# Patient Record
Sex: MALE | Race: WHITE | Employment: OTHER | URBAN - METROPOLITAN AREA
[De-identification: names, ages, dates, MRNs, and addresses within clinical notes are randomized per-mention and may not be internally consistent; named-entity substitution may affect disease eponyms.]

---

## 2017-01-05 ENCOUNTER — OFFICE VISIT (OUTPATIENT)
Dept: CARDIOLOGY CLINIC | Age: 62
End: 2017-01-05

## 2017-01-05 VITALS
SYSTOLIC BLOOD PRESSURE: 114 MMHG | HEART RATE: 56 BPM | WEIGHT: 266 LBS | BODY MASS INDEX: 41.75 KG/M2 | HEIGHT: 67 IN | DIASTOLIC BLOOD PRESSURE: 60 MMHG

## 2017-01-05 DIAGNOSIS — E78.00 PURE HYPERCHOLESTEROLEMIA: ICD-10-CM

## 2017-01-05 DIAGNOSIS — I25.119 CORONARY ARTERY DISEASE INVOLVING NATIVE CORONARY ARTERY OF NATIVE HEART WITH ANGINA PECTORIS (HCC): Primary | ICD-10-CM

## 2017-01-05 DIAGNOSIS — I10 ESSENTIAL HYPERTENSION: ICD-10-CM

## 2017-01-05 PROCEDURE — 99214 OFFICE O/P EST MOD 30 MIN: CPT | Performed by: INTERNAL MEDICINE

## 2017-01-05 RX ORDER — LISINOPRIL 5 MG/1
5 TABLET ORAL DAILY
Qty: 90 TABLET | Refills: 3 | Status: SHIPPED | OUTPATIENT
Start: 2017-01-05 | End: 2018-01-21 | Stop reason: SDUPTHER

## 2017-02-03 RX ORDER — PANTOPRAZOLE SODIUM 40 MG/1
TABLET, DELAYED RELEASE ORAL
Qty: 90 TABLET | Refills: 3 | Status: SHIPPED | OUTPATIENT
Start: 2017-02-03 | End: 2020-06-03

## 2017-02-03 RX ORDER — PRASUGREL HCL 10 MG
TABLET ORAL
Qty: 90 TABLET | Refills: 3 | Status: SHIPPED | OUTPATIENT
Start: 2017-02-03 | End: 2018-01-29 | Stop reason: SDUPTHER

## 2017-08-01 RX ORDER — ATORVASTATIN CALCIUM 80 MG/1
TABLET, FILM COATED ORAL
Qty: 90 TABLET | Refills: 1 | Status: SHIPPED | OUTPATIENT
Start: 2017-08-01 | End: 2018-01-24 | Stop reason: SDUPTHER

## 2018-01-22 RX ORDER — LISINOPRIL 5 MG/1
TABLET ORAL
Qty: 90 TABLET | Refills: 0 | OUTPATIENT
Start: 2018-01-22

## 2018-01-22 RX ORDER — LISINOPRIL 5 MG/1
TABLET ORAL
Qty: 90 TABLET | Refills: 0 | Status: SHIPPED | OUTPATIENT
Start: 2018-01-22 | End: 2018-04-25 | Stop reason: SDUPTHER

## 2018-01-24 RX ORDER — ATORVASTATIN CALCIUM 80 MG/1
TABLET, FILM COATED ORAL
Qty: 90 TABLET | Refills: 3 | Status: SHIPPED | OUTPATIENT
Start: 2018-01-24 | End: 2019-01-20 | Stop reason: SDUPTHER

## 2018-01-29 RX ORDER — PRASUGREL 10 MG/1
TABLET, FILM COATED ORAL
Qty: 90 TABLET | Refills: 0 | Status: SHIPPED | OUTPATIENT
Start: 2018-01-29 | End: 2018-04-25 | Stop reason: SDUPTHER

## 2018-02-01 ENCOUNTER — OFFICE VISIT (OUTPATIENT)
Dept: CARDIOLOGY CLINIC | Age: 63
End: 2018-02-01

## 2018-02-01 VITALS
HEART RATE: 55 BPM | HEIGHT: 66 IN | BODY MASS INDEX: 44.52 KG/M2 | SYSTOLIC BLOOD PRESSURE: 138 MMHG | DIASTOLIC BLOOD PRESSURE: 80 MMHG | WEIGHT: 277 LBS

## 2018-02-01 DIAGNOSIS — E78.00 PURE HYPERCHOLESTEROLEMIA: ICD-10-CM

## 2018-02-01 DIAGNOSIS — I10 ESSENTIAL HYPERTENSION: ICD-10-CM

## 2018-02-01 DIAGNOSIS — I25.119 CORONARY ARTERY DISEASE INVOLVING NATIVE CORONARY ARTERY OF NATIVE HEART WITH ANGINA PECTORIS (HCC): Primary | ICD-10-CM

## 2018-02-01 PROCEDURE — 99214 OFFICE O/P EST MOD 30 MIN: CPT | Performed by: INTERNAL MEDICINE

## 2018-02-01 PROCEDURE — 93000 ELECTROCARDIOGRAM COMPLETE: CPT | Performed by: INTERNAL MEDICINE

## 2018-02-01 NOTE — PROGRESS NOTES
mouth daily 30 tablet 5     No current facility-administered medications for this visit. Social History:  History     Social History    Marital Status:      Spouse Name: N/A     Number of Children: N/A    Years of Education: N/A     Occupational History         Social History Main Topics    Smoking status: Never Smoker     Smokeless tobacco: Not on file    Alcohol Use: Yes      Comment: occoc    Drug Use: No    Sexual Activity:     Partners: Female     Other Topics Concern         Social History Narrative       Family History:  family history includes Heart Disease in his brother, father, and mother; Other in his sister. Allergies:  Review of patient's allergies indicates no known allergies. Review of Systems:   · Constitutional: there has been no unanticipated weight loss. No change in energy or activity level   · Eyes: No visual changes   · ENT: No Headaches, hearing loss or vertigo. No mouth sores or sore throat. · Cardiovascular: Reviewed in HPI  · Respiratory: No cough or wheezing, no sputum production. · Gastrointestinal: No abdominal pain, appetite loss, blood in stools. No change in bowel or bladder habits. · Genitourinary: No nocturia, dysuria, trouble voiding  · Musculoskeletal:  No gait disturbance, arthritis in his feet  · Integumentary: No rash or pruritis. · Neurological: No headache, change in muscle strength, numbness or tingling. No change in gait, balance, coordination, mood, affect, memory, mentation, behavior. · Psychiatric: No anxiety or depression  · Endocrine: No malaise or fever  · Hematologic/Lymphatic: No abnormal bruising or bleeding, blood clots or swollen lymph nodes. · Allergic/Immunologic: No nasal congestion or hives.     Physical Examination:    Vitals:    02/01/18 1229   BP: 138/80   Site: Left Arm   Position: Sitting   Cuff Size: Large Adult   Pulse: 55   Weight: 277 lb (125.6 kg)   Height: 5' 6\" (1.676 m)     Body mass

## 2018-02-01 NOTE — LETTER
[address] [facility]iology  [place] [address] [address] [place] [place] [address]  Phone: [phone]  Fax: [phone]    Acacia Acosta MD        February 2, 2018     Mehdi Allan  No address on file    Patient: Manish Zhu  MR Number: [id number]  YOB: 1955  Date of Visit: 2/1/2018    Dear Dr. Mhedi Allan:    Thank you for the request for consultation for Radha Hayes to me for the evaluation of Mr Jia Morales. Below are the relevant portions of my assessment and plan of care. [address]   Cardiac Evaluation      Patient: Manish Zhu  YOB: 1955  Date: 2/1/18       Chief Complaint   Patient presents with    Coronary Artery Disease    Hyperlipidemia        Referring provider: Mehdi Allan    History of Present Illness:  Mr Jia Morales is seen today in follow up. He was hospitalized January, 2016. His hospital course included a plain stress test, at which time he had very poor exercise tolerance. He was only able to walk on a treadmill for 4 minutes with a high pertinence of blood pressure response. He had less than 1 mm up-sloping ST depression; however, had progressive angina on the treadmill. He was admitted to the intensive care and serial cardiac enzymes were performed which have been rapidly increasing from 0.01 to 0.02 to 0.03. Patient had an angiogram / PCI of RCA done with  3.0 x 38 Alpine. Today, Mr Jia Morales states he has been well. Denies any chest pain, dizziness, or edema. He does have palpitations that come and go. He has not been exercising because of foot pain. He has gained approx 10lbs. Past Medical History:   has a past medical history of CAD (coronary artery disease) and Hyperlipidemia. Surgical History:   has a past surgical history that includes Total hip arthroplasty (Left); Coronary angioplasty with stent (2016); and joint replacement (2000).      Current Outpatient Prescriptions · Neurological: No headache, change in muscle strength, numbness or tingling. No change in gait, balance, coordination, mood, affect, memory, mentation, behavior. · Psychiatric: No anxiety or depression  · Endocrine: No malaise or fever  · Hematologic/Lymphatic: No abnormal bruising or bleeding, blood clots or swollen lymph nodes. · Allergic/Immunologic: No nasal congestion or hives. Physical Examination:    Vitals:    02/01/18 1229   BP: 138/80   Site: Left Arm   Position: Sitting   Cuff Size: Large Adult   Pulse: 55   Weight: 277 lb (125.6 kg)   Height: 5' 6\" (1.676 m)     Body mass index is 44.71 kg/m². Wt Readings from Last 3 Encounters:   02/01/18 277 lb (125.6 kg)   01/05/17 266 lb (120.7 kg)   06/12/16 254 lb (115.2 kg)      BP Readings from Last 3 Encounters:   02/01/18 138/80   01/05/17 114/60   06/12/16 131/53      Constitutional and General Appearance:  appears stated age  Respiratory:  · Normal excursion and expansion without use of accessory muscles  · Resp Auscultation: Normal breath sounds without dullness  Cardiovascular:  · The apical impulses not displaced  · Heart is regular rate and rhythm with normal S1, S2  · The carotid upstroke is normal, no bruit noted   · JVP is not elevated  · Peripheral pulses are symmetrical  · There is no clubbing, cyanosis of the extremities  · No edema  · Femoral Arteries: 2+ and equal  · Pedal Pulses: 2+ and equal   Abdomen:  · No masses or tenderness  · Normal bowel sounds  Neurological/Psychiatric:  · Alert and oriented x3  · Moves all extremities well  · Exhibits normal gait balance and coordination    Assessment:  1. Chest discomfort --none at this time   2. Pure hypercholesterolemia -stable, labs 3/4/16: ; TRIG 246; HDL 40; LDL 41   3. Essential hypertension -stable   4.  Coronary artery disease involving native coronary artery of native heart with angina pectoris -stable  EKG today shows sinus bradycardia with no ischemia

## 2018-02-02 NOTE — COMMUNICATION BODY
[address]   Cardiac Evaluation      Patient: Tressa Hodgkins  YOB: 1955  Date: 2/1/18       Chief Complaint   Patient presents with    Coronary Artery Disease    Hyperlipidemia        Referring provider: Maciel Aragon    History of Present Illness:  Mr Carol Hayes is seen today in follow up. He was hospitalized January, 2016. His hospital course included a plain stress test, at which time he had very poor exercise tolerance. He was only able to walk on a treadmill for 4 minutes with a high pertinence of blood pressure response. He had less than 1 mm up-sloping ST depression; however, had progressive angina on the treadmill. He was admitted to the intensive care and serial cardiac enzymes were performed which have been rapidly increasing from 0.01 to 0.02 to 0.03. Patient had an angiogram / PCI of RCA done with  3.0 x 38 Alpine. Today, Mr Carol Hayes states he has been well. Denies any chest pain, dizziness, or edema. He does have palpitations that come and go. He has not been exercising because of foot pain. He has gained approx 10lbs. Past Medical History:   has a past medical history of CAD (coronary artery disease) and Hyperlipidemia. Surgical History:   has a past surgical history that includes Total hip arthroplasty (Left); Coronary angioplasty with stent (2016); and joint replacement (2000). Current Outpatient Prescriptions   Medication Sig Dispense Refill    prasugrel (EFFIENT) 10 MG TABS TAKE 1 TABLET BY MOUTH EVERY DAY 90 tablet 0    metoprolol tartrate (LOPRESSOR) 25 MG tablet TAKE ONE TABLET BY MOUTH TWICE DAILY 180 tablet 3    atorvastatin (LIPITOR) 80 MG tablet TAKE 1 TABLET BY MOUTH EVERY NIGHT.  90 tablet 3    lisinopril (PRINIVIL;ZESTRIL) 5 MG tablet TAKE 1 TABLET BY MOUTH EVERY DAY 90 tablet 0    pantoprazole (PROTONIX) 40 MG tablet TAKE ONE TABLET BY MOUTH EVERY MORNING BEFORE BREAKFAST 90 tablet 3    aspirin EC 81 MG EC tablet Take 1 tablet by Stable cardiac status. I discussed with him the importance of regular exercise and advised him to work on weight loss. No med changes. Check lipids and CMP. FU 6 months. Thank you for allowing to me to participate in the care of Ryan Mccord.

## 2018-04-27 RX ORDER — PRASUGREL 10 MG/1
TABLET, FILM COATED ORAL
Qty: 90 TABLET | Refills: 3 | Status: SHIPPED | OUTPATIENT
Start: 2018-04-27 | End: 2019-03-01 | Stop reason: SDUPTHER

## 2018-04-27 RX ORDER — LISINOPRIL 5 MG/1
TABLET ORAL
Qty: 90 TABLET | Refills: 3 | Status: SHIPPED | OUTPATIENT
Start: 2018-04-27 | End: 2019-03-01 | Stop reason: SDUPTHER

## 2019-01-21 RX ORDER — ATORVASTATIN CALCIUM 80 MG/1
TABLET, FILM COATED ORAL
Qty: 30 TABLET | Refills: 0 | Status: SHIPPED | OUTPATIENT
Start: 2019-01-21 | End: 2019-03-01 | Stop reason: SDUPTHER

## 2019-03-01 RX ORDER — PRASUGREL 10 MG/1
TABLET, FILM COATED ORAL
Qty: 10 TABLET | Refills: 0 | Status: SHIPPED | OUTPATIENT
Start: 2019-03-01 | End: 2019-03-18 | Stop reason: SDUPTHER

## 2019-03-01 RX ORDER — ATORVASTATIN CALCIUM 80 MG/1
TABLET, FILM COATED ORAL
Qty: 10 TABLET | Refills: 0 | Status: SHIPPED | OUTPATIENT
Start: 2019-03-01 | End: 2019-03-18 | Stop reason: SDUPTHER

## 2019-03-01 RX ORDER — LISINOPRIL 5 MG/1
TABLET ORAL
Qty: 10 TABLET | Refills: 0 | Status: SHIPPED | OUTPATIENT
Start: 2019-03-01 | End: 2019-03-18 | Stop reason: SDUPTHER

## 2019-03-18 RX ORDER — LISINOPRIL 5 MG/1
TABLET ORAL
Qty: 10 TABLET | Refills: 0 | Status: SHIPPED | OUTPATIENT
Start: 2019-03-18 | End: 2019-03-25 | Stop reason: SDUPTHER

## 2019-03-18 RX ORDER — ATORVASTATIN CALCIUM 80 MG/1
TABLET, FILM COATED ORAL
Qty: 10 TABLET | Refills: 0 | Status: SHIPPED | OUTPATIENT
Start: 2019-03-18 | End: 2019-03-25 | Stop reason: SDUPTHER

## 2019-03-18 RX ORDER — PRASUGREL 10 MG/1
TABLET, FILM COATED ORAL
Qty: 10 TABLET | Refills: 0 | Status: SHIPPED | OUTPATIENT
Start: 2019-03-18 | End: 2019-03-27 | Stop reason: SDUPTHER

## 2019-03-25 ENCOUNTER — TELEPHONE (OUTPATIENT)
Dept: CARDIOLOGY CLINIC | Age: 64
End: 2019-03-25

## 2019-03-25 RX ORDER — LISINOPRIL 5 MG/1
TABLET ORAL
Qty: 30 TABLET | Refills: 0 | Status: SHIPPED | OUTPATIENT
Start: 2019-03-25 | End: 2019-03-27 | Stop reason: SDUPTHER

## 2019-03-25 RX ORDER — ATORVASTATIN CALCIUM 80 MG/1
TABLET, FILM COATED ORAL
Qty: 30 TABLET | Refills: 0 | Status: SHIPPED | OUTPATIENT
Start: 2019-03-25 | End: 2019-03-27 | Stop reason: SDUPTHER

## 2019-03-27 ENCOUNTER — OFFICE VISIT (OUTPATIENT)
Dept: CARDIOLOGY CLINIC | Age: 64
End: 2019-03-27
Payer: COMMERCIAL

## 2019-03-27 VITALS
DIASTOLIC BLOOD PRESSURE: 72 MMHG | HEIGHT: 66 IN | WEIGHT: 290 LBS | HEART RATE: 69 BPM | BODY MASS INDEX: 46.61 KG/M2 | SYSTOLIC BLOOD PRESSURE: 136 MMHG

## 2019-03-27 DIAGNOSIS — I10 ESSENTIAL HYPERTENSION: Primary | ICD-10-CM

## 2019-03-27 DIAGNOSIS — E78.00 PURE HYPERCHOLESTEROLEMIA: ICD-10-CM

## 2019-03-27 DIAGNOSIS — I25.119 CORONARY ARTERY DISEASE INVOLVING NATIVE CORONARY ARTERY OF NATIVE HEART WITH ANGINA PECTORIS (HCC): ICD-10-CM

## 2019-03-27 PROCEDURE — 99214 OFFICE O/P EST MOD 30 MIN: CPT | Performed by: INTERNAL MEDICINE

## 2019-03-27 PROCEDURE — 93000 ELECTROCARDIOGRAM COMPLETE: CPT | Performed by: INTERNAL MEDICINE

## 2019-03-27 RX ORDER — PRASUGREL 10 MG/1
TABLET, FILM COATED ORAL
Qty: 90 TABLET | Refills: 3 | Status: SHIPPED | OUTPATIENT
Start: 2019-03-27 | End: 2020-03-24 | Stop reason: SDUPTHER

## 2019-03-27 RX ORDER — LISINOPRIL 5 MG/1
TABLET ORAL
Qty: 90 TABLET | Refills: 3 | Status: SHIPPED | OUTPATIENT
Start: 2019-03-27

## 2019-03-27 RX ORDER — ATORVASTATIN CALCIUM 80 MG/1
TABLET, FILM COATED ORAL
Qty: 90 TABLET | Refills: 3 | Status: SHIPPED | OUTPATIENT
Start: 2019-03-27

## 2019-09-17 ENCOUNTER — OFFICE VISIT (OUTPATIENT)
Dept: CARDIOLOGY CLINIC | Age: 64
End: 2019-09-17
Payer: COMMERCIAL

## 2019-09-17 VITALS
HEART RATE: 58 BPM | WEIGHT: 267.3 LBS | SYSTOLIC BLOOD PRESSURE: 114 MMHG | HEIGHT: 66 IN | DIASTOLIC BLOOD PRESSURE: 60 MMHG | BODY MASS INDEX: 42.96 KG/M2 | OXYGEN SATURATION: 95 %

## 2019-09-17 DIAGNOSIS — I10 ESSENTIAL HYPERTENSION: ICD-10-CM

## 2019-09-17 DIAGNOSIS — E78.00 PURE HYPERCHOLESTEROLEMIA: Primary | ICD-10-CM

## 2019-09-17 DIAGNOSIS — I25.119 CORONARY ARTERY DISEASE INVOLVING NATIVE CORONARY ARTERY OF NATIVE HEART WITH ANGINA PECTORIS (HCC): ICD-10-CM

## 2019-09-17 DIAGNOSIS — E11.9 CONTROLLED TYPE 2 DIABETES MELLITUS WITHOUT COMPLICATION, WITHOUT LONG-TERM CURRENT USE OF INSULIN (HCC): ICD-10-CM

## 2019-09-17 PROCEDURE — 99214 OFFICE O/P EST MOD 30 MIN: CPT | Performed by: INTERNAL MEDICINE

## 2019-09-17 RX ORDER — GABAPENTIN 800 MG/1
TABLET ORAL 3 TIMES DAILY
COMMUNITY
Start: 2019-09-09 | End: 2021-12-14

## 2019-09-17 NOTE — PROGRESS NOTES
lymph nodes. · Allergic/Immunologic: No nasal congestion or hives. Physical Examination:    Vitals:    09/17/19 1523   BP: 114/60   Site: Right Upper Arm   Position: Sitting   Cuff Size: Medium Adult   Pulse: 58   SpO2: 95%   Weight: 267 lb 4.8 oz (121.2 kg)   Height: 5' 6\" (1.676 m)     Body mass index is 43.14 kg/m². Wt Readings from Last 3 Encounters:   09/17/19 267 lb 4.8 oz (121.2 kg)   03/27/19 290 lb (131.5 kg)   02/01/18 277 lb (125.6 kg)      BP Readings from Last 3 Encounters:   09/17/19 114/60   03/27/19 136/72   02/01/18 138/80      Constitutional and General Appearance:  appears stated age, obese, big white beard  Respiratory:  · Normal excursion and expansion without use of accessory muscles  · Resp Auscultation: Normal breath sounds without dullness  Cardiovascular:  · The apical impulses not displaced  · Heart is regular rate and rhythm with normal S1, S2  · The carotid upstroke is normal, no bruit noted   · JVP is not elevated  · Peripheral pulses are symmetrical  · There is no clubbing, cyanosis of the extremities  · No edema  · Femoral Arteries: 2+ and equal  · Pedal Pulses: 2+ and equal   Abdomen:  · No masses or tenderness  · Normal bowel sounds  Neurological/Psychiatric:  · Alert and oriented x3  · Moves all extremities well  · Exhibits normal gait balance and coordination    Assessment:  1. Chest discomfort --no complaints at this time   2. Pure hypercholesterolemia - stable on labs 4/17/19: ; TRIG 116; HDL 39; LDL 70   3. Essential hypertension -stable   4. Coronary artery disease involving native coronary artery of native heart with angina pectoris -stable  GXT 8/16: normal perfusion, normal LV function  Cardiac Cath 1/16:  LVEDP - 8, mild inferior hypokinesis, EF 55%. Cors: LM - nl, LAD 0%, 30% at DX1 and 50% after Dx2.  LCX - nl, RCA - 70% mid tapering to 99% mid  PCI of RCA done with 2.5x15 balloon ff by 3.0 x 38 Alpine, and prox with 3.5x20 noncompliant with 0%

## 2019-09-17 NOTE — LETTER
 gabapentin (NEURONTIN) 600 MG tablet TAKE 1 TABLET BY MOUTH THREE TIMES DAILY      metFORMIN (GLUCOPHAGE) 500 MG tablet TK 1 T PO BID WC  2    atorvastatin (LIPITOR) 80 MG tablet TAKE 1 TABLET BY MOUTH EVERY NIGHT. 90 tablet 3    lisinopril (PRINIVIL;ZESTRIL) 5 MG tablet TAKE 1 TABLET BY MOUTH EVERY DAY 90 tablet 3    metoprolol tartrate (LOPRESSOR) 25 MG tablet TAKE ONE TABLET BY MOUTH TWICE DAILY 180 tablet 3    prasugrel (EFFIENT) 10 MG TABS TAKE 1 TABLET BY MOUTH EVERY DAY 90 tablet 3    aspirin EC 81 MG EC tablet Take 1 tablet by mouth daily 30 tablet 5    pantoprazole (PROTONIX) 40 MG tablet TAKE ONE TABLET BY MOUTH EVERY MORNING BEFORE BREAKFAST (Patient not taking: Reported on 9/17/2019) 90 tablet 3     No current facility-administered medications for this visit. Social History:  History     Social History    Marital Status:      Spouse Name: N/A     Number of Children: N/A    Years of Education: N/A     Occupational History         Social History Main Topics    Smoking status: Never Smoker     Smokeless tobacco: Not on file    Alcohol Use: Yes      Comment: occoc    Drug Use: No    Sexual Activity:     Partners: Female     Other Topics Concern         Social History Narrative       Family History:  family history includes Heart Disease in his brother, father, and mother; Other in his sister. Allergies:  Patient has no known allergies. Review of Systems:   · Constitutional: there has been no unanticipated weight loss. No change in energy or activity level   · Eyes: No visual changes   · ENT: No Headaches, hearing loss or vertigo. No mouth sores or sore throat. · Cardiovascular: Reviewed in HPI  · Respiratory: No cough or wheezing, no sputum production. · Gastrointestinal: No abdominal pain, appetite loss, blood in stools. No change in bowel or bladder habits.   · Genitourinary: No nocturia, dysuria, trouble voiding

## 2019-09-18 ENCOUNTER — HOSPITAL ENCOUNTER (OUTPATIENT)
Age: 64
Discharge: HOME | End: 2019-09-18
Payer: COMMERCIAL

## 2019-09-18 DIAGNOSIS — E78.00 PURE HYPERCHOLESTEROLEMIA: ICD-10-CM

## 2019-09-18 DIAGNOSIS — E11.9 CONTROLLED TYPE 2 DIABETES MELLITUS WITHOUT COMPLICATION, WITHOUT LONG-TERM CURRENT USE OF INSULIN (HCC): ICD-10-CM

## 2019-09-18 LAB
A/G RATIO: 1.3 (ref 1.1–2.2)
ALBUMIN SERPL-MCNC: 4 G/DL (ref 3.4–5)
ALP BLD-CCNC: 64 U/L (ref 40–129)
ALT SERPL-CCNC: 25 U/L (ref 10–40)
ANION GAP SERPL CALCULATED.3IONS-SCNC: 11 MMOL/L (ref 3–16)
AST SERPL-CCNC: 19 U/L (ref 15–37)
BILIRUB SERPL-MCNC: 0.3 MG/DL (ref 0–1)
BUN BLDV-MCNC: 20 MG/DL (ref 7–20)
CALCIUM SERPL-MCNC: 9.5 MG/DL (ref 8.3–10.6)
CHLORIDE BLD-SCNC: 106 MMOL/L (ref 99–110)
CHOLESTEROL, TOTAL: 109 MG/DL (ref 0–199)
CO2: 25 MMOL/L (ref 21–32)
CREAT SERPL-MCNC: 1.2 MG/DL (ref 0.8–1.3)
ESTIMATED AVERAGE GLUCOSE: 125.5 MG/DL
GFR AFRICAN AMERICAN: >60
GFR NON-AFRICAN AMERICAN: >60
GLOBULIN: 3 G/DL
GLUCOSE BLD-MCNC: 113 MG/DL (ref 70–99)
HBA1C MFR BLD: 6 %
HDLC SERPL-MCNC: 37 MG/DL (ref 40–60)
LDL CHOLESTEROL CALCULATED: 51 MG/DL
POTASSIUM SERPL-SCNC: 4.6 MMOL/L (ref 3.5–5.1)
SODIUM BLD-SCNC: 142 MMOL/L (ref 136–145)
TOTAL PROTEIN: 7 G/DL (ref 6.4–8.2)
TRIGL SERPL-MCNC: 106 MG/DL (ref 0–150)
VLDLC SERPL CALC-MCNC: 21 MG/DL

## 2019-09-18 PROCEDURE — 36415 COLL VENOUS BLD VENIPUNCTURE: CPT

## 2019-09-18 PROCEDURE — 83036 HEMOGLOBIN GLYCOSYLATED A1C: CPT

## 2019-09-18 PROCEDURE — 80061 LIPID PANEL: CPT

## 2019-09-18 PROCEDURE — 80053 COMPREHEN METABOLIC PANEL: CPT

## 2020-03-24 RX ORDER — PRASUGREL 10 MG/1
TABLET, FILM COATED ORAL
Qty: 90 TABLET | Refills: 3 | Status: SHIPPED | OUTPATIENT
Start: 2020-03-24 | End: 2021-01-15

## 2020-06-03 ENCOUNTER — OFFICE VISIT (OUTPATIENT)
Dept: CARDIOLOGY CLINIC | Age: 65
End: 2020-06-03
Payer: COMMERCIAL

## 2020-06-03 VITALS
OXYGEN SATURATION: 95 % | WEIGHT: 270 LBS | HEART RATE: 62 BPM | SYSTOLIC BLOOD PRESSURE: 110 MMHG | TEMPERATURE: 95.7 F | BODY MASS INDEX: 43.39 KG/M2 | HEIGHT: 66 IN | DIASTOLIC BLOOD PRESSURE: 70 MMHG

## 2020-06-03 PROCEDURE — 93000 ELECTROCARDIOGRAM COMPLETE: CPT | Performed by: INTERNAL MEDICINE

## 2020-06-03 PROCEDURE — 99214 OFFICE O/P EST MOD 30 MIN: CPT | Performed by: INTERNAL MEDICINE

## 2020-06-03 NOTE — PROGRESS NOTES
MOUTH TWICE DAILY 180 tablet 3    aspirin EC 81 MG EC tablet Take 1 tablet by mouth daily 30 tablet 5     No current facility-administered medications for this visit. Social History:  History     Social History    Marital Status:      Spouse Name: N/A     Number of Children: N/A    Years of Education: N/A     Occupational History         Social History Main Topics    Smoking status: Never Smoker     Smokeless tobacco: Not on file    Alcohol Use: Yes      Comment: occoc    Drug Use: No    Sexual Activity:     Partners: Female     Other Topics Concern     of people and pets      Social History Narrative       Family History:  family history includes Heart Disease in his brother, father, and mother; Other in his sister. Allergies:  Patient has no known allergies. Review of Systems:   · Constitutional: there has been no unanticipated weight loss. No change in energy or activity level   · Eyes: No visual changes   · ENT: No Headaches, hearing loss or vertigo. No mouth sores or sore throat. · Cardiovascular: Reviewed in HPI  · Respiratory: No cough or wheezing, no sputum production. · Gastrointestinal: No abdominal pain, appetite loss, blood in stools. No change in bowel or bladder habits. · Genitourinary: No nocturia, dysuria, trouble voiding  · Musculoskeletal:  No gait disturbance, arthritis in his feet  · Integumentary: No rash or pruritis. · Neurological: No headache, change in muscle strength, numbness or tingling. No change in gait, balance, coordination, mood, affect, memory, mentation, behavior. · Psychiatric: No anxiety or depression  · Endocrine: No malaise or fever  · Hematologic/Lymphatic: No abnormal bruising or bleeding, blood clots or swollen lymph nodes. · Allergic/Immunologic: No nasal congestion or hives.     Physical Examination:    Vitals:    06/03/20 1432   BP: 110/70   Site: Left Upper Arm   Position: Sitting   Cuff Size: Large Adult

## 2020-06-03 NOTE — LETTER
tablet 3    gabapentin (NEURONTIN) 800 MG tablet       metFORMIN (GLUCOPHAGE) 500 MG tablet TK 1 T PO BID WC  2    atorvastatin (LIPITOR) 80 MG tablet TAKE 1 TABLET BY MOUTH EVERY NIGHT. 90 tablet 3    lisinopril (PRINIVIL;ZESTRIL) 5 MG tablet TAKE 1 TABLET BY MOUTH EVERY DAY 90 tablet 3    metoprolol tartrate (LOPRESSOR) 25 MG tablet TAKE ONE TABLET BY MOUTH TWICE DAILY 180 tablet 3    aspirin EC 81 MG EC tablet Take 1 tablet by mouth daily 30 tablet 5     No current facility-administered medications for this visit. Social History:  History     Social History    Marital Status:      Spouse Name: N/A     Number of Children: N/A    Years of Education: N/A     Occupational History         Social History Main Topics    Smoking status: Never Smoker     Smokeless tobacco: Not on file    Alcohol Use: Yes      Comment: occoc    Drug Use: No    Sexual Activity:     Partners: Female     Other Topics Concern     of people and pets      Social History Narrative       Family History:  family history includes Heart Disease in his brother, father, and mother; Other in his sister. Allergies:  Patient has no known allergies. Review of Systems:   · Constitutional: there has been no unanticipated weight loss. No change in energy or activity level   · Eyes: No visual changes   · ENT: No Headaches, hearing loss or vertigo. No mouth sores or sore throat. · Cardiovascular: Reviewed in HPI  · Respiratory: No cough or wheezing, no sputum production. · Gastrointestinal: No abdominal pain, appetite loss, blood in stools. No change in bowel or bladder habits. · Genitourinary: No nocturia, dysuria, trouble voiding  · Musculoskeletal:  No gait disturbance, arthritis in his feet  · Integumentary: No rash or pruritis. · Neurological: No headache, change in muscle strength, numbness or tingling. No change in gait, balance, coordination, mood, affect, memory, mentation, behavior. · Psychiatric: No anxiety or depression  · Endocrine: No malaise or fever  · Hematologic/Lymphatic: No abnormal bruising or bleeding, blood clots or swollen lymph nodes. · Allergic/Immunologic: No nasal congestion or hives. Physical Examination:    Vitals:    06/03/20 1432   BP: 110/70   Site: Left Upper Arm   Position: Sitting   Cuff Size: Large Adult   Pulse: 62   Temp: 95.7 °F (35.4 °C)   SpO2: 95%   Weight: 270 lb (122.5 kg)   Height: 5' 6\" (1.676 m)     Body mass index is 43.58 kg/m². Wt Readings from Last 3 Encounters:   06/03/20 270 lb (122.5 kg)   09/17/19 267 lb 4.8 oz (121.2 kg)   03/27/19 290 lb (131.5 kg)      BP Readings from Last 3 Encounters:   06/03/20 110/70   09/17/19 114/60   03/27/19 136/72      Constitutional and General Appearance:  appears stated age, obese, big white beard  Respiratory:  · Normal excursion and expansion without use of accessory muscles  · Resp Auscultation: Normal breath sounds without dullness  Cardiovascular:  · The apical impulses not displaced  · Heart is regular rate and rhythm with normal S1, S2  · The carotid upstroke is normal, no bruit noted   · JVP is not elevated  · Peripheral pulses are symmetrical  · There is no clubbing, cyanosis of the extremities  · No edema  · Femoral Arteries: 2+ and equal  · Pedal Pulses: 2+ and equal   Abdomen:  · No masses or tenderness  · Normal bowel sounds  Neurological/Psychiatric:  · Alert and oriented x3  · Moves all extremities well  · Exhibits normal gait balance and coordination    Assessment:  1. Chest discomfort --no complaints at this time, recently had an episode of weakness/fatigue   2. Pure hypercholesterolemia - stable on labs 9/18/19: ; TRIG 106; HDL 37; LDL 51   3. Essential hypertension -stable   4.  Coronary artery disease involving native coronary artery of native heart with angina pectoris -stable  GXT 8/16: normal perfusion, normal LV function

## 2020-12-16 ENCOUNTER — OFFICE VISIT (OUTPATIENT)
Dept: CARDIOLOGY CLINIC | Age: 65
End: 2020-12-16
Payer: COMMERCIAL

## 2020-12-16 VITALS
WEIGHT: 261 LBS | HEART RATE: 59 BPM | DIASTOLIC BLOOD PRESSURE: 60 MMHG | HEIGHT: 66 IN | SYSTOLIC BLOOD PRESSURE: 118 MMHG | BODY MASS INDEX: 41.95 KG/M2 | OXYGEN SATURATION: 96 %

## 2020-12-16 PROCEDURE — 99214 OFFICE O/P EST MOD 30 MIN: CPT | Performed by: INTERNAL MEDICINE

## 2020-12-16 NOTE — PROGRESS NOTES
[facility]   Cardiac Evaluation      Patient: Ac Johnson  YOB: 1955  Date: 12/16/20       Chief Complaint   Patient presents with    Coronary Artery Disease    Hypertension        Referring provider: Almita Saleh    History of Present Illness:  Mr Armin Huynh is seen today in follow up. He was hospitalized January, 2016. His hospital course included a plain stress test, at which time he had very poor exercise tolerance. He was only able to walk on a treadmill for 4 minutes with a hypertensive blood pressure response. He had less than 1 mm up-sloping ST depression; however, had progressive angina on the treadmill. He was admitted to the intensive care and serial cardiac enzymes were performed which continued to  increasing from 0.01 to 0.02 to 0.03. He underwent angiogram / PCI of RCA done with  3.0 x 38 Alpine. Today, Mr Armin Huynh states he has been fine. He denies any chest pain, palpitations, ROLLE, dizziness, or edema. 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 Alicia Avila recently received a new c-pap and follows with  pulmonary. He is not exercising like he used to. Past Medical History:   has a past medical history of CAD (coronary artery disease) and Hyperlipidemia. Surgical History:   has a past surgical history that includes Total hip arthroplasty (Left); Coronary angioplasty with stent (2016); and joint replacement (2000). Current Outpatient Medications   Medication Sig Dispense Refill    prasugrel (EFFIENT) 10 MG TABS TAKE 1 TABLET BY MOUTH EVERY DAY 90 tablet 3    gabapentin (NEURONTIN) 800 MG tablet 3 times daily.  metFORMIN (GLUCOPHAGE) 500 MG tablet 1,000 mg daily (with breakfast)   2    atorvastatin (LIPITOR) 80 MG tablet TAKE 1 TABLET BY MOUTH EVERY NIGHT.  90 tablet 3    lisinopril (PRINIVIL;ZESTRIL) 5 MG tablet TAKE 1 TABLET BY MOUTH EVERY DAY 90 tablet 3    metoprolol tartrate (LOPRESSOR) 25 MG tablet TAKE ONE Adult   Pulse: 59   SpO2: 96%   Weight: 261 lb (118.4 kg)   Height: 5' 6\" (1.676 m)     Body mass index is 42.13 kg/m². Wt Readings from Last 3 Encounters:   12/16/20 261 lb (118.4 kg)   06/03/20 270 lb (122.5 kg)   09/17/19 267 lb 4.8 oz (121.2 kg)      BP Readings from Last 3 Encounters:   12/16/20 118/60   06/03/20 110/70   09/17/19 114/60      Constitutional and General Appearance:  appears stated age, obese, big white beard  Respiratory:  · Normal excursion and expansion without use of accessory muscles  · Resp Auscultation: Normal breath sounds without dullness  Cardiovascular:  · The apical impulses not displaced  · Heart is regular rate and rhythm with normal S1, S2  · The carotid upstroke is normal, no bruit noted   · JVP is not elevated  · Peripheral pulses are symmetrical  · There is no clubbing, cyanosis of the extremities  · No edema  · Femoral Arteries: 2+ and equal  · Pedal Pulses: 2+ and equal   Abdomen:  · No masses or tenderness  · Normal bowel sounds  Neurological/Psychiatric:  · Alert and oriented x3  · Moves all extremities well  · Exhibits normal gait balance and coordination    Assessment:  1. Chest discomfort --no complaints at this time   2. Pure hypercholesterolemia - stable on labs 9/18/19: ; TRIG 106; HDL 37; LDL 51   3. Essential hypertension -stable   4. Coronary artery disease involving native coronary artery of native heart with angina pectoris -stable  GXT 8/16: normal perfusion, normal LV function  Cardiac Cath 1/16:  LVEDP - 8, mild inferior hypokinesis, EF 55%. Cors: LM - nl, LAD 0%, 30% at DX1 and 50% after Dx2. LCX - nl, RCA - 70% mid tapering to 99% mid  PCI of RCA done with 2.5x15 balloon ff by 3.0 x 38 Alpine, and prox with 3.5x20 noncompliant with 0% residual.  Echo 1/29/16: Normal left ventricle size, wall thickness and hyperdynamic systolic  function w/ EF 44%. / No regional wall motion abnormalities are seen. No valvular abnormalities  Present.  There is trivial tricuspid regurgitation with RVSP 31 mmHg. Trivial mitral regurgitation is present      PLAN:  Repeat lipids and CMP. No med changes. Regular exercise encouraged. FU 6 months. Scribe's attestation: This note was scribed in the presence of Dr Valerie Cortes MD by Eliel Bruno RN. The scribe's documentation has been prepared under my direction and personally reviewed by me in its entirety. I confirm that the note above accurately reflects all work, treatment, procedures, and medical decision making performed by me. Thank you for allowing to me to participate in the care of Letty Correa.

## 2020-12-16 NOTE — LETTER
[address] Cardiology [facility]  [address] [facility] [address]  Phone: [phone]  Fax: [phone]    Chas Tobin MD        December 18, 2020     [place] Sandy  [address] [place] [address]    Patient: Daryl Ritter  MR Number: [id number]  YOB: 1955  Date of Visit: 12/16/2020    Dear Dr. Georgia Sandy:        [address]   Cardiac Evaluation      Patient: Daryl Ritter  YOB: 1955  Date: 12/16/20       Chief Complaint   Patient presents with    Coronary Artery Disease    Hypertension        Referring provider: Georgia Sandy    History of Present Illness:  Mr Lion Sandoval is seen today in follow up. He was hospitalized January, 2016. His hospital course included a plain stress test, at which time he had very poor exercise tolerance. He was only able to walk on a treadmill for 4 minutes with a hypertensive blood pressure response. He had less than 1 mm up-sloping ST depression; however, had progressive angina on the treadmill. He was admitted to the intensive care and serial cardiac enzymes were performed which continued to  increasing from 0.01 to 0.02 to 0.03. He underwent angiogram / PCI of RCA done with  3.0 x 38 Alpine. Today, Mr Lion Sandoval states he has been fine. He denies any chest pain, palpitations, ROLLE, dizziness, or edema. 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 Chantel Ricketts recently received a new c-pap and follows with  pulmonary. He is not exercising like he used to. Past Medical History:   has a past medical history of CAD (coronary artery disease) and Hyperlipidemia. Surgical History:   has a past surgical history that includes Total hip arthroplasty (Left); Coronary angioplasty with stent (2016); and joint replacement (2000).      Current Outpatient Medications   Medication Sig Dispense Refill    prasugrel (EFFIENT) 10 MG TABS TAKE 1 TABLET BY MOUTH EVERY DAY 90 tablet 3  gabapentin (NEURONTIN) 800 MG tablet 3 times daily.  metFORMIN (GLUCOPHAGE) 500 MG tablet 1,000 mg daily (with breakfast)   2    atorvastatin (LIPITOR) 80 MG tablet TAKE 1 TABLET BY MOUTH EVERY NIGHT. 90 tablet 3    lisinopril (PRINIVIL;ZESTRIL) 5 MG tablet TAKE 1 TABLET BY MOUTH EVERY DAY 90 tablet 3    metoprolol tartrate (LOPRESSOR) 25 MG tablet TAKE ONE TABLET BY MOUTH TWICE DAILY 180 tablet 3    aspirin EC 81 MG EC tablet Take 1 tablet by mouth daily 30 tablet 5     No current facility-administered medications for this visit. Social History:  History     Social History    Marital Status:      Spouse Name: N/A     Number of Children: N/A    Years of Education: N/A     Occupational History         Social History Main Topics    Smoking status: Never Smoker     Smokeless tobacco: Not on file    Alcohol Use: Yes      Comment: occoc    Drug Use: No    Sexual Activity:     Partners: Female     Other Topics Concern     of people and pets      Social History Narrative       Family History:  family history includes Heart Disease in his brother, father, and mother; Other in his sister. Allergies:  Patient has no known allergies. Review of Systems:   · Constitutional: there has been no unanticipated weight loss. No change in energy or activity level   · Eyes: No visual changes   · ENT: No Headaches, hearing loss or vertigo. No mouth sores or sore throat. · Cardiovascular: Reviewed in HPI  · Respiratory: No cough or wheezing, no sputum production. · Gastrointestinal: No abdominal pain, appetite loss, blood in stools. No change in bowel or bladder habits. · Genitourinary: No nocturia, dysuria, trouble voiding  · Musculoskeletal:  No gait disturbance, arthritis in his feet  · Integumentary: No rash or pruritis.

## 2021-01-02 ENCOUNTER — HOSPITAL ENCOUNTER (OUTPATIENT)
Age: 66
Discharge: HOME | End: 2021-01-02
Payer: COMMERCIAL

## 2021-01-02 DIAGNOSIS — E78.00 PURE HYPERCHOLESTEROLEMIA: ICD-10-CM

## 2021-01-02 LAB
A/G RATIO: 1.3 (ref 1.1–2.2)
ALBUMIN SERPL-MCNC: 3.9 G/DL (ref 3.4–5)
ALP BLD-CCNC: 74 U/L (ref 40–129)
ALT SERPL-CCNC: 26 U/L (ref 10–40)
ANION GAP SERPL CALCULATED.3IONS-SCNC: 11 MMOL/L (ref 3–16)
AST SERPL-CCNC: 20 U/L (ref 15–37)
BILIRUB SERPL-MCNC: 0.3 MG/DL (ref 0–1)
BUN BLDV-MCNC: 21 MG/DL (ref 7–20)
CALCIUM SERPL-MCNC: 9.1 MG/DL (ref 8.3–10.6)
CHLORIDE BLD-SCNC: 107 MMOL/L (ref 99–110)
CO2: 26 MMOL/L (ref 21–32)
CREAT SERPL-MCNC: 1.2 MG/DL (ref 0.8–1.3)
GFR AFRICAN AMERICAN: >60
GFR NON-AFRICAN AMERICAN: >60
GLOBULIN: 3 G/DL
GLUCOSE BLD-MCNC: 120 MG/DL (ref 70–99)
POTASSIUM SERPL-SCNC: 4.3 MMOL/L (ref 3.5–5.1)
SODIUM BLD-SCNC: 144 MMOL/L (ref 136–145)
TOTAL PROTEIN: 6.9 G/DL (ref 6.4–8.2)

## 2021-01-02 PROCEDURE — 80053 COMPREHEN METABOLIC PANEL: CPT

## 2021-01-02 PROCEDURE — 36415 COLL VENOUS BLD VENIPUNCTURE: CPT

## 2021-01-04 ENCOUNTER — HOSPITAL ENCOUNTER (OUTPATIENT)
Age: 66
Discharge: HOME | End: 2021-01-04
Payer: COMMERCIAL

## 2021-01-04 LAB
CHOLESTEROL, TOTAL: 114 MG/DL (ref 0–199)
HDLC SERPL-MCNC: 46 MG/DL (ref 40–60)
LDL CHOLESTEROL CALCULATED: 53 MG/DL
TRIGL SERPL-MCNC: 74 MG/DL (ref 0–150)
VLDLC SERPL CALC-MCNC: 15 MG/DL

## 2021-01-04 PROCEDURE — 80061 LIPID PANEL: CPT

## 2021-01-04 PROCEDURE — 36415 COLL VENOUS BLD VENIPUNCTURE: CPT

## 2021-01-15 RX ORDER — CLOPIDOGREL BISULFATE 75 MG/1
75 TABLET ORAL DAILY
Qty: 90 TABLET | Refills: 1 | Status: SHIPPED | OUTPATIENT
Start: 2021-01-15 | End: 2021-06-09

## 2021-01-15 NOTE — TELEPHONE ENCOUNTER
Spoke w/ pt. He states he went to  Effient at [place], but was told it is too early for the refill and it will cost him 1000$ out of pocket. He did not pick it up and is now out of med. I spoke w/ Dr Alex Hall and per Dr Hilario Crowley Effient to Plavix which is cheaper. I relayed medication change to patient. He is agreeable to switch to Plavix. I informed him I will send Plavix 75mg tab 1 po daily to [facility] and put a note to cancel Effient rx.

## 2021-02-17 ENCOUNTER — TELEPHONE (OUTPATIENT)
Dept: CARDIOLOGY CLINIC | Age: 66
End: 2021-02-17

## 2021-02-17 NOTE — TELEPHONE ENCOUNTER
Spoke w/pt and gave instructions per Dr Divina Aguila. He verbalized understanding. He states he is taking Effient still, he never got Plavix filled.

## 2021-02-17 NOTE — TELEPHONE ENCOUNTER
Pt calling he is having a Colonoscopy on 03/04/2021 and needs to know if he can stop his ASA and Effient (Looks like we have him on Plavix pt states the bottle he has is for generic Effient the prasugrel) 7 days prior to procedure?  Pls call to advise Thank you

## 2021-06-09 ENCOUNTER — OFFICE VISIT (OUTPATIENT)
Dept: CARDIOLOGY CLINIC | Age: 66
End: 2021-06-09
Payer: COMMERCIAL

## 2021-06-09 VITALS
WEIGHT: 265 LBS | BODY MASS INDEX: 42.59 KG/M2 | SYSTOLIC BLOOD PRESSURE: 116 MMHG | OXYGEN SATURATION: 98 % | HEIGHT: 66 IN | HEART RATE: 67 BPM | DIASTOLIC BLOOD PRESSURE: 62 MMHG

## 2021-06-09 DIAGNOSIS — E78.00 PURE HYPERCHOLESTEROLEMIA: ICD-10-CM

## 2021-06-09 DIAGNOSIS — I25.119 CORONARY ARTERY DISEASE INVOLVING NATIVE CORONARY ARTERY OF NATIVE HEART WITH ANGINA PECTORIS (HCC): Primary | ICD-10-CM

## 2021-06-09 DIAGNOSIS — I10 ESSENTIAL HYPERTENSION: ICD-10-CM

## 2021-06-09 PROCEDURE — 99214 OFFICE O/P EST MOD 30 MIN: CPT | Performed by: INTERNAL MEDICINE

## 2021-06-09 RX ORDER — ROPINIROLE 0.25 MG/1
TABLET, FILM COATED ORAL
COMMUNITY
Start: 2021-05-25

## 2021-06-09 RX ORDER — FLUTICASONE PROPIONATE 50 MCG
SPRAY, SUSPENSION (ML) NASAL
COMMUNITY
Start: 2021-06-02

## 2021-06-09 RX ORDER — PRASUGREL 10 MG/1
10 TABLET, FILM COATED ORAL DAILY
COMMUNITY
Start: 2021-01-13

## 2021-06-09 NOTE — PROGRESS NOTES
[address]   Cardiac Evaluation      Patient: Kristan Kathleen  YOB: 1955  Date: 6/9/21       Chief Complaint   Patient presents with    Coronary Artery Disease    Hyperlipidemia    Hypertension        Referring provider: Ger Phillip    History of Present Illness:  Mr Parvez Majano is seen today in follow up. He was hospitalized January, 2016. His hospital course included a plain stress test, at which time he had very poor exercise tolerance. He was only able to walk on a treadmill for 4 minutes with a hypertensive blood pressure response. He had less than 1 mm up-sloping ST depression; however, had progressive angina on the treadmill. He was admitted to the intensive care and serial cardiac enzymes were performed which continued to  increasing from 0.01 to 0.02 to 0.03. He underwent angiogram / PCI of RCA done with  3.0 x 38 Alpine. Today, Mr Parvez Majano states he has been fine. He denies any chest pain, palpitations, ROLLE, dizziness, or edema. He does not do routine exercise, but states he is active. He plans on swimming this summer. He has problems with neuropathy and has been on high dose gabapentin but is not sure if it is helping. Past Medical History:   has a past medical history of CAD (coronary artery disease) and Hyperlipidemia. Surgical History:   has a past surgical history that includes Total hip arthroplasty (Left); Coronary angioplasty with stent (2016); and joint replacement (2000). Current Outpatient Medications   Medication Sig Dispense Refill    rOPINIRole (REQUIP) 0.25 MG tablet TAKE 1 TABLET BY MOUTH EVERY NIGHT AT BEDTIME AS NEEDED      prasugrel (EFFIENT) 10 MG TABS Take 10 mg by mouth daily      gabapentin (NEURONTIN) 800 MG tablet 3 times daily.  metFORMIN (GLUCOPHAGE) 500 MG tablet 1,000 mg daily (with breakfast)   2    atorvastatin (LIPITOR) 80 MG tablet TAKE 1 TABLET BY MOUTH EVERY NIGHT.  90 tablet 3    lisinopril (PRINIVIL;ZESTRIL) 5 MG tablet TAKE 1 TABLET BY MOUTH EVERY DAY 90 tablet 3    metoprolol tartrate (LOPRESSOR) 25 MG tablet TAKE ONE TABLET BY MOUTH TWICE DAILY 180 tablet 3    aspirin EC 81 MG EC tablet Take 1 tablet by mouth daily 30 tablet 5    fluticasone (FLONASE) 50 MCG/ACT nasal spray SHAKE LIQUID AND USE 1 SPRAY IN EACH NOSTRIL EVERY DAY       No current facility-administered medications for this visit. Social History:  History     Social History    Marital Status:      Spouse Name: N/A     Number of Children: N/A    Years of Education: N/A     Occupational History         Social History Main Topics    Smoking status: Never Smoker     Smokeless tobacco: Not on file    Alcohol Use: Yes      Comment: occoc    Drug Use: No    Sexual Activity:     Partners: Female     Other Topics Concern     of people and pets      Social History Narrative       Family History:  family history includes Heart Disease in his brother, father, and mother; Other in his sister. Allergies:  Patient has no known allergies. Review of Systems:   · Constitutional: there has been no unanticipated weight loss. No change in energy or activity level   · Eyes: No visual changes   · ENT: No Headaches, hearing loss or vertigo. No mouth sores or sore throat. · Cardiovascular: Reviewed in HPI  · Respiratory: No cough or wheezing, no sputum production. · Gastrointestinal: No abdominal pain, appetite loss, blood in stools. No change in bowel or bladder habits. · Genitourinary: No nocturia, dysuria, trouble voiding  · Musculoskeletal:  No gait disturbance, arthritis in his feet  · Integumentary: No rash or pruritis. · Neurological: No headache, change in muscle strength, numbness or tingling. No change in gait, balance, coordination, mood, affect, memory, mentation, behavior.   · Psychiatric: No anxiety or depression  · Endocrine: No malaise or fever  · Hematologic/Lymphatic: No abnormal bruising or bleeding, hyperdynamic systolic  function w/ EF 54%. / No regional wall motion abnormalities are seen. No valvular abnormalities  Present. There is trivial tricuspid regurgitation with RVSP 31 mmHg. Trivial mitral regurgitation is present        PLAN:   Stable cardiac status. No med changes. Regular exercise encouraged. FU 6 months. Scribe's attestation: This note was scribed in the presence of Dr Harley Amanda MD by Anya Grajeda RN. The scribe's documentation has been prepared under my direction and personally reviewed by me in its entirety. I confirm that the note above accurately reflects all work, treatment, procedures, and medical decision making performed by me. Thank you for allowing to me to participate in the care of Mary Xie.

## 2021-12-14 ENCOUNTER — OFFICE VISIT (OUTPATIENT)
Dept: CARDIOLOGY CLINIC | Age: 66
End: 2021-12-14
Payer: COMMERCIAL

## 2021-12-14 VITALS
OXYGEN SATURATION: 98 % | DIASTOLIC BLOOD PRESSURE: 62 MMHG | WEIGHT: 255 LBS | BODY MASS INDEX: 40.98 KG/M2 | SYSTOLIC BLOOD PRESSURE: 118 MMHG | HEART RATE: 50 BPM | HEIGHT: 66 IN

## 2021-12-14 DIAGNOSIS — E78.00 PURE HYPERCHOLESTEROLEMIA: ICD-10-CM

## 2021-12-14 DIAGNOSIS — E11.9 TYPE 2 DIABETES MELLITUS WITHOUT COMPLICATION, WITHOUT LONG-TERM CURRENT USE OF INSULIN (HCC): ICD-10-CM

## 2021-12-14 DIAGNOSIS — I10 ESSENTIAL HYPERTENSION: Primary | ICD-10-CM

## 2021-12-14 PROCEDURE — 99214 OFFICE O/P EST MOD 30 MIN: CPT | Performed by: INTERNAL MEDICINE

## 2021-12-14 RX ORDER — PREGABALIN 25 MG/1
CAPSULE ORAL
COMMUNITY
Start: 2021-11-20 | End: 2022-06-29

## 2021-12-14 NOTE — PROGRESS NOTES
[facility]   Cardiac Evaluation      Patient: Tierra Bhakta  YOB: 1955  Date: 12/14/21       Chief Complaint   Patient presents with    Coronary Artery Disease    Hypertension        Referring provider: Michael Brown    History of Present Illness:  Mr Qureshi Said is seen today in follow up. He was hospitalized January, 2016. His hospital course included a plain stress test, at which time he had very poor exercise tolerance. He was only able to walk on a treadmill for 4 minutes with a hypertensive blood pressure response. He had less than 1 mm up-sloping ST depression; however, had progressive angina on the treadmill. He was admitted to the intensive care and serial cardiac enzymes were performed which continued to  increasing from 0.01 to 0.02 to 0.03. He underwent angiogram / PCI of RCA done with  3.0 x 38 Alpine. Today, Mr Qureshi Said states he has been well. 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 Bri Knight denies chest pain, palpitations, ROLLE, dizziness, or edema. Mr Qureshi Said stopped Gabapentin because it was not helping him. He walks for exercise. Past Medical History:   has a past medical history of CAD (coronary artery disease) and Hyperlipidemia. Surgical History:   has a past surgical history that includes Total hip arthroplasty (Left); Coronary angioplasty with stent (2016); and joint replacement (2000). Current Outpatient Medications   Medication Sig Dispense Refill    pregabalin (LYRICA) 25 MG capsule       prasugrel (EFFIENT) 10 MG TABS Take 10 mg by mouth daily      metFORMIN (GLUCOPHAGE) 500 MG tablet 1,000 mg daily (with breakfast)   2    atorvastatin (LIPITOR) 80 MG tablet TAKE 1 TABLET BY MOUTH EVERY NIGHT.  90 tablet 3    lisinopril (PRINIVIL;ZESTRIL) 5 MG tablet TAKE 1 TABLET BY MOUTH EVERY DAY 90 tablet 3    metoprolol tartrate (LOPRESSOR) 25 MG tablet TAKE ONE TABLET BY MOUTH TWICE DAILY 180 tablet 3    aspirin EC 81 MG EC tablet Take 1 tablet by mouth daily 30 tablet 5    rOPINIRole (REQUIP) 0.25 MG tablet TAKE 1 TABLET BY MOUTH EVERY NIGHT AT BEDTIME AS NEEDED      fluticasone (FLONASE) 50 MCG/ACT nasal spray SHAKE LIQUID AND USE 1 SPRAY IN EACH NOSTRIL EVERY DAY       No current facility-administered medications for this visit. Social History:  History     Social History    Marital Status:      Spouse Name: N/A     Number of Children: N/A    Years of Education: N/A     Occupational History         Social History Main Topics    Smoking status: Never Smoker     Smokeless tobacco: Not on file    Alcohol Use: Yes      Comment: occoc    Drug Use: No    Sexual Activity:     Partners: Female     Other Topics Concern     of people and pets      Social History Narrative       Family History:  family history includes Heart Disease in his brother, father, and mother; Other in his sister. Allergies:  Patient has no known allergies. Review of Systems:   · Constitutional: there has been no unanticipated weight loss. No change in energy or activity level   · Eyes: No visual changes   · ENT: No Headaches, hearing loss or vertigo. No mouth sores or sore throat. · Cardiovascular: Reviewed in HPI  · Respiratory: No cough or wheezing, no sputum production. · Gastrointestinal: No abdominal pain, appetite loss, blood in stools. No change in bowel or bladder habits. · Genitourinary: No nocturia, dysuria, trouble voiding  · Musculoskeletal:  No gait disturbance, arthritis in his feet  · Integumentary: No rash or pruritis. · Neurological: No headache, change in muscle strength, numbness or tingling. No change in gait, balance, coordination, mood, affect, memory, mentation, behavior. · Psychiatric: No anxiety or depression  · Endocrine: No malaise or fever  · Hematologic/Lymphatic: No abnormal bruising or bleeding, blood clots or swollen lymph nodes. · Allergic/Immunologic: No nasal congestion or hives.     Physical Examination: Vitals:    12/14/21 1515   BP: 118/62   Site: Left Upper Arm   Position: Sitting   Cuff Size: Medium Adult   Pulse: 50   SpO2: 98%   Weight: 255 lb (115.7 kg)   Height: 5' 6\" (1.676 m)     Body mass index is 41.16 kg/m². Wt Readings from Last 3 Encounters:   12/14/21 255 lb (115.7 kg)   06/09/21 265 lb (120.2 kg)   12/16/20 261 lb (118.4 kg)      BP Readings from Last 3 Encounters:   12/14/21 118/62   06/09/21 116/62   12/16/20 118/60      Constitutional and General Appearance:  appears stated age, obese, big white beard  Respiratory:  · Normal excursion and expansion without use of accessory muscles  · Resp Auscultation: Normal breath sounds without dullness  Cardiovascular:  · The apical impulses not displaced  · Heart is regular rate and rhythm with normal S1, S2  · The carotid upstroke is normal, no bruit noted   · JVP is not elevated  · Peripheral pulses are symmetrical  · There is no clubbing, cyanosis of the extremities  · No edema  · Femoral Arteries: 2+ and equal  · Pedal Pulses: 2+ and equal   Abdomen:  · No masses or tenderness  · Normal bowel sounds  Neurological/Psychiatric:  · Alert and oriented x3  · Moves all extremities well  · Exhibits normal gait balance and coordination    Assessment:  1. Chest discomfort --no complaints at this time   2. Pure hypercholesterolemia - stable on labs 2/10/21: ;  TRIG 80;  HDL 40; LDL 58, lipitor 80mg   3. Essential hypertension -stable   4. Coronary artery disease involving native coronary artery of native heart with angina pectoris -stable  GXT 8/16: normal perfusion, normal LV function  Cardiac Cath 1/16:  LVEDP - 8, mild inferior hypokinesis, EF 55%. Cors: LM - nl, LAD 0%, 30% at DX1 and 50% after Dx2.  LCX - nl, RCA - 70% mid tapering to 99% mid  PCI of RCA done with 2.5x15 balloon ff by 3.0 x 38 Alpine, and prox with 3.5x20 noncompliant with 0% residual.  Echo 1/29/16: Normal left ventricle size, wall thickness and hyperdynamic systolic  function w/ EF 70%. / No regional wall motion abnormalities are seen. No valvular abnormalities  Present. There is trivial tricuspid regurgitation with RVSP 31 mmHg. Trivial mitral regurgitation is present        PLAN:  Stable cardiac status. No med changes. FU 6 months. Repeat lipids, CMP, and A1c. Scribe's attestation: This note was scribed in the presence of Dr Sd Barrera MD by Miguel Lezama RN. The scribe's documentation has been prepared under my direction and personally reviewed by me in its entirety. I confirm that the note above accurately reflects all work, treatment, procedures, and medical decision making performed by me. Thank you for allowing to me to participate in the care of Dorothea Nelson.

## 2022-01-05 LAB
ESTIMATED AVERAGE GLUCOSE: 131 MG/DL (ref 68–114)
HBA1C MFR BLD: 6.2 % (ref 4–5.6)

## 2022-01-06 LAB
A/G RATIO: 1.5 (ref 1–2.1)
ALBUMIN SERPL-MCNC: 4 G/DL (ref 3.5–5)
ALP BLD-CCNC: 70 U/L (ref 33–140)
ALT SERPL-CCNC: 30 U/L (ref 0–60)
ANION GAP SERPL CALCULATED.3IONS-SCNC: 11 MMOL/L (ref 5–13)
AST SERPL-CCNC: 20 U/L (ref 0–40)
BILIRUB SERPL-MCNC: 0.5 MG/DL (ref 0.2–1.2)
BUN / CREAT RATIO: 22
BUN BLDV-MCNC: 24 MG/DL (ref 7–25)
CALCIUM SERPL-MCNC: 9 MG/DL (ref 8.8–10.9)
CHLORIDE BLD-SCNC: 106 MMOL/L (ref 98–110)
CHOLESTEROL, TOTAL: 138 MG/DL (ref 125–199)
CHOLESTEROL/HDL RATIO: 2.9 (ref 0–5)
CO2: 25 MMOL/L (ref 22–29)
CREAT SERPL-MCNC: 1.08 MG/DL (ref 0.5–1.3)
GFR AFRICAN AMERICAN: 83 SEE NOTE
GFR NON-AFRICAN AMERICAN: 68 SEE NOTE
GLOBULIN: 2.7 G/DL (ref 2–3.7)
GLUCOSE BLD-MCNC: 101 MG/DL (ref 71–99)
HDLC SERPL-MCNC: 48 MG/DL (ref 40–180)
LDL CHOLESTEROL CALCULATED: 72 MG/DL (ref 0–100)
POTASSIUM SERPL-SCNC: 4.3 MMOL/L (ref 3.5–5.1)
SODIUM BLD-SCNC: 142 MMOL/L (ref 135–146)
TOTAL PROTEIN: 6.7 G/DL (ref 6–8)
TRIGL SERPL-MCNC: 90 MG/DL (ref 0–149)

## 2022-06-24 NOTE — PROGRESS NOTES
[address]   Cardiac Evaluation      Patient: Janiya Baez  YOB: 1955  Date: 6/29/22       Chief Complaint   Patient presents with    Coronary Artery Disease    Hyperlipidemia    Hypertension        Referring provider: Alton Goodwin    History of Present Illness:  Mr Dennis Guerra is seen today in follow up. He was hospitalized January, 2016. His hospital course included a plain stress test, at which time he had very poor exercise tolerance. He was only able to walk on a treadmill for 4 minutes with a hypertensive blood pressure response. He had less than 1 mm up-sloping ST depression; however, had progressive angina on the treadmill. He was admitted to the intensive care and serial cardiac enzymes were performed which continued to increasing from 0.01 to 0.02 to 0.03. He underwent angiogram / PCI of RCA done with  3.0 x 38 Alpine. Today, Mr Dennis Guerra states he is having diarrhea, sleeplessness, and mild confusion. He states these symptoms have been ongoing for quite a while. He is asking if these symptoms are from Atorvastatin. Farhan Kruse denies chest pain, palpitations, ROLLE, dizziness, or edema. Farhan Kruse has neuropathy in his feet so he is unable to exercise. Past Medical History:   has a past medical history of CAD (coronary artery disease) and Hyperlipidemia. DM type 2, sleep apnea     Surgical History:   has a past surgical history that includes Total hip arthroplasty (Left); Coronary angioplasty with stent (2016); and joint replacement (2000). Current Outpatient Medications   Medication Sig Dispense Refill    rOPINIRole (REQUIP) 0.25 MG tablet TAKE 1 TABLET BY MOUTH EVERY NIGHT AT BEDTIME AS NEEDED      prasugrel (EFFIENT) 10 MG TABS Take 10 mg by mouth daily      metFORMIN (GLUCOPHAGE) 500 MG tablet 1,000 mg daily (with breakfast)   2    atorvastatin (LIPITOR) 80 MG tablet TAKE 1 TABLET BY MOUTH EVERY NIGHT.  90 tablet 3    lisinopril (PRINIVIL;ZESTRIL) 5 MG tablet TAKE 1 TABLET BY MOUTH EVERY DAY 90 tablet 3    metoprolol tartrate (LOPRESSOR) 25 MG tablet TAKE ONE TABLET BY MOUTH TWICE DAILY 180 tablet 3    aspirin EC 81 MG EC tablet Take 1 tablet by mouth daily 30 tablet 5    fluticasone (FLONASE) 50 MCG/ACT nasal spray SHAKE LIQUID AND USE 1 SPRAY IN EACH NOSTRIL EVERY DAY       No current facility-administered medications for this visit. Social History:  History     Social History    Marital Status:      Spouse Name: N/A     Number of Children: N/A    Years of Education: N/A     Occupational History         Social History Main Topics    Smoking status: Never Smoker     Smokeless tobacco: Not on file    Alcohol Use: Yes      Comment: occoc    Drug Use: No    Sexual Activity:     Partners: Female     Other Topics Concern     of people and pets      Social History Narrative       Family History:  family history includes Heart Disease in his brother, father, and mother; Other in his sister. Allergies:  Patient has no known allergies. Review of Systems:   · Constitutional: there has been no unanticipated weight loss. No change in energy or activity level   · Eyes: No visual changes   · ENT: No Headaches, hearing loss or vertigo. No mouth sores or sore throat. · Cardiovascular: Reviewed in HPI  · Respiratory: No cough or wheezing, no sputum production. · Gastrointestinal: No abdominal pain, appetite loss, blood in stools. No change in bowel or bladder habits. · Genitourinary: No nocturia, dysuria, trouble voiding  · Musculoskeletal:  No gait disturbance, arthritis in his feet  · Integumentary: No rash or pruritis. · Neurological: No headache, change in muscle strength, numbness or tingling. No change in gait, balance, coordination, mood, affect, memory, mentation, behavior.   · Psychiatric: No anxiety or depression  · Endocrine: No malaise or fever  · Hematologic/Lymphatic: No abnormal bruising or bleeding, blood clots or swollen lymph nodes. · Allergic/Immunologic: No nasal congestion or hives. Physical Examination:    Vitals:    06/29/22 1323   BP: 128/60   Site: Left Upper Arm   Position: Sitting   Cuff Size: Large Adult   Pulse: 54   SpO2: 98%   Weight: 257 lb (116.6 kg)   Height: 5' 6\" (1.676 m)     Body mass index is 41.48 kg/m². Wt Readings from Last 3 Encounters:   06/29/22 257 lb (116.6 kg)   12/14/21 255 lb (115.7 kg)   06/09/21 265 lb (120.2 kg)      BP Readings from Last 3 Encounters:   06/29/22 128/60   12/14/21 118/62   06/09/21 116/62      Constitutional and General Appearance:  appears stated age, obese, big white beard  Respiratory:  · Normal excursion and expansion without use of accessory muscles  · Resp Auscultation: Normal breath sounds without dullness  Cardiovascular:  · The apical impulses not displaced  · Heart is regular rate and rhythm with normal S1, S2  · The carotid upstroke is normal, no bruit noted   · JVP is not elevated  · Peripheral pulses are symmetrical  · There is no clubbing, cyanosis of the extremities  · No edema  · Femoral Arteries: 2+ and equal  · Pedal Pulses: 2+ and equal   Abdomen:  · No masses or tenderness  · Normal bowel sounds  Neurological/Psychiatric:  · Alert and oriented x3  · Moves all extremities well  · Exhibits normal gait balance and coordination    Assessment:  1. Chest discomfort --no complaints at this time   2. Pure hypercholesterolemia - stable on labs 1/5/22: ; TRIG 90; HDL 48; LDL 72, lipitor 80mg   3. Essential hypertension -stable   4. Coronary artery disease involving native coronary artery of native heart with angina pectoris -stable  GXT 8/16: normal perfusion, normal LV function  Cardiac Cath 1/16:  LVEDP - 8, mild inferior hypokinesis, EF 55%. Cors: LM - nl, LAD 0%, 30% at DX1 and 50% after Dx2.  LCX - nl, RCA - 70% mid tapering to 99% mid  PCI of RCA done with 2.5x15 balloon ff by 3.0 x 38 Alpine, and prox with 3.5x20 noncompliant with 0% residual.  Echo 1/29/16: Normal left ventricle size, wall thickness and hyperdynamic systolic  function w/ EF 85%. / No regional wall motion abnormalities are seen. No valvular abnormalities  Present. There is trivial tricuspid regurgitation with RVSP 31 mmHg. Trivial mitral regurgitation is present      EKG>sinus bradycardia, HR 51bpm, will decrease BB    PLAN: Advised to change Metformin from 1000mg once daily to 500mg BID  w meals; lipitor does not cause diarrhea. . He will discuss sleep disturbance with sleep apnea physician. Change Metoprolol Tartrate to Toprol XL 25mg nightly. Regular exercise is encouraged such as swimming or recumbent bike. . FU 6 months. Scribe's attestation: This note was scribed in the presence of Dr Liliya Sampson MD by Mynor Santos RN. The scribe's documentation has been prepared under my direction and personally reviewed by me in its entirety. I confirm that the note above accurately reflects all work, treatment, procedures, and medical decision making performed by me. Thank you for allowing to me to participate in the care of Tahira Miller.

## 2022-06-29 ENCOUNTER — OFFICE VISIT (OUTPATIENT)
Dept: CARDIOLOGY CLINIC | Age: 67
End: 2022-06-29
Payer: COMMERCIAL

## 2022-06-29 VITALS
OXYGEN SATURATION: 98 % | DIASTOLIC BLOOD PRESSURE: 60 MMHG | HEART RATE: 54 BPM | WEIGHT: 257 LBS | BODY MASS INDEX: 41.3 KG/M2 | HEIGHT: 66 IN | SYSTOLIC BLOOD PRESSURE: 128 MMHG

## 2022-06-29 DIAGNOSIS — E78.00 PURE HYPERCHOLESTEROLEMIA: ICD-10-CM

## 2022-06-29 DIAGNOSIS — I10 ESSENTIAL HYPERTENSION: Primary | ICD-10-CM

## 2022-06-29 DIAGNOSIS — I25.119 CORONARY ARTERY DISEASE INVOLVING NATIVE CORONARY ARTERY OF NATIVE HEART WITH ANGINA PECTORIS (HCC): ICD-10-CM

## 2022-06-29 PROCEDURE — 1123F ACP DISCUSS/DSCN MKR DOCD: CPT | Performed by: INTERNAL MEDICINE

## 2022-06-29 PROCEDURE — 93000 ELECTROCARDIOGRAM COMPLETE: CPT | Performed by: INTERNAL MEDICINE

## 2022-06-29 PROCEDURE — 99214 OFFICE O/P EST MOD 30 MIN: CPT | Performed by: INTERNAL MEDICINE

## 2022-06-29 RX ORDER — METOPROLOL SUCCINATE 25 MG/1
25 TABLET, EXTENDED RELEASE ORAL NIGHTLY
Qty: 90 TABLET | Refills: 3 | Status: SHIPPED | OUTPATIENT
Start: 2022-06-29

## 2023-01-27 NOTE — PROGRESS NOTES
[address]   Cardiac Evaluation      Patient: Froilan Castillo  YOB: 1955  Date: 2/1/23       Chief Complaint   Patient presents with    Coronary Artery Disease    Hypertension    Hyperlipidemia          Referring provider: Vu Fuentes    History of Present Illness:  Mr Jose Francisco Ayala is seen today in follow up. He was hospitalized January, 2016. His hospital course included a plain stress test, at which time he had very poor exercise tolerance. He was only able to walk on a treadmill for 4 minutes with a hypertensive blood pressure response. He had less than 1 mm up-sloping ST depression; however, had progressive angina on the treadmill. He was admitted to the intensive care and serial cardiac enzymes were performed which continued to increasing from 0.01 to 0.02 to 0.03. He underwent angiogram / PCI of RCA done with 3.0 x 38 Alpine. Today, Mr Jose Francisco Ayala states he has been swimming laps at the [facility] regularly (3x/week). Tj Pat states he feels winded when swimming. He denies chest pain, palpitations, dizziness, or edema. Past Medical History:   has a past medical history of CAD (coronary artery disease) and Hyperlipidemia. DM type 2, sleep apnea     Surgical History:   has a past surgical history that includes Total hip arthroplasty (Left); Coronary angioplasty with stent (2016); and joint replacement (2000). Current Outpatient Medications   Medication Sig Dispense Refill    metoprolol succinate (TOPROL XL) 25 MG extended release tablet Take 1 tablet by mouth at bedtime 90 tablet 3    rOPINIRole (REQUIP) 0.25 MG tablet TAKE 1 TABLET BY MOUTH EVERY NIGHT AT BEDTIME AS NEEDED      prasugrel (EFFIENT) 10 MG TABS Take 10 mg by mouth daily      metFORMIN (GLUCOPHAGE) 500 MG tablet 500 mg 2 times daily (with meals)  2    atorvastatin (LIPITOR) 80 MG tablet TAKE 1 TABLET BY MOUTH EVERY NIGHT.  90 tablet 3    lisinopril (PRINIVIL;ZESTRIL) 5 MG tablet TAKE 1 TABLET BY MOUTH EVERY DAY 90 tablet 3 aspirin EC 81 MG EC tablet Take 1 tablet by mouth daily 30 tablet 5    fluticasone (FLONASE) 50 MCG/ACT nasal spray SHAKE LIQUID AND USE 1 SPRAY IN EACH NOSTRIL EVERY DAY       No current facility-administered medications for this visit. Social History:  History     Social History    Marital Status:      Spouse Name: N/A     Number of Children: N/A    Years of Education: N/A     Occupational History         Social History Main Topics    Smoking status: Never Smoker     Smokeless tobacco: Not on file    Alcohol Use: Yes      Comment: occoc    Drug Use: No      Sexual Activity:       Partners: Female     Other Topics Concern     of people and pets      Social History Narrative       Family History:  family history includes Heart Disease in his brother, father, and mother; Other in his sister. Allergies:  Patient has no known allergies. Review of Systems:   Constitutional: there has been no unanticipated weight loss. No change in energy or activity level   Eyes: No visual changes   ENT: No Headaches, hearing loss or vertigo. No mouth sores or sore throat. Cardiovascular: Reviewed in HPI  Respiratory: No cough or wheezing, no sputum production. Gastrointestinal: No abdominal pain, appetite loss, blood in stools. No change in bowel or bladder habits. Genitourinary: No nocturia, dysuria, trouble voiding  Musculoskeletal:  No gait disturbance, arthritis in his feet  Integumentary: No rash or pruritis. Neurological: No headache, change in muscle strength, numbness or tingling. No change in gait, balance, coordination, mood, affect, memory, mentation, behavior. Psychiatric: No anxiety or depression  Endocrine: No malaise or fever  Hematologic/Lymphatic: No abnormal bruising or bleeding, blood clots or swollen lymph nodes. Allergic/Immunologic: No nasal congestion or hives.     Physical Examination:    Vitals:    02/01/23 1607   BP: 134/70   Site: Right Lower Arm   Position: Sitting   Cuff Size: Medium Adult   Pulse: 51   SpO2: 98%   Weight: 259 lb (117.5 kg)   Height: 5' 6\" (1.676 m)       Body mass index is 41.8 kg/m². Wt Readings from Last 3 Encounters:   02/01/23 259 lb (117.5 kg)   06/29/22 257 lb (116.6 kg)   12/14/21 255 lb (115.7 kg)      BP Readings from Last 3 Encounters:   02/01/23 134/70   06/29/22 128/60   12/14/21 118/62      Constitutional and General Appearance:  appears stated age, obese, big white beard  Respiratory:  Normal excursion and expansion without use of accessory muscles  Resp Auscultation: Normal breath sounds without dullness  Cardiovascular: The apical impulses not displaced  Heart is regular rate and rhythm with normal S1, S2  The carotid upstroke is normal, no bruit noted   JVP is not elevated  Peripheral pulses are symmetrical  There is no clubbing, cyanosis of the extremities  No edema  Femoral Arteries: 2+ and equal  Pedal Pulses: 2+ and equal   Abdomen:  No masses or tenderness  Normal bowel sounds  Neurological/Psychiatric:  Alert and oriented x3  Moves all extremities well  Exhibits normal gait balance and coordination    Assessment:  1. Chest discomfort --no complaints at this time   2. Pure hypercholesterolemia - stable on labs 1/5/22: ; TRIG 90; HDL 48; LDL 72, lipitor 80mg   3. Essential hypertension - on Lisinopril, but has cough so will change to Losartan 25mg daily   4. Coronary artery disease involving native coronary artery of native heart with angina pectoris -stable  GXT 8/16: normal perfusion, normal LV function  Cardiac Cath 1/16:  LVEDP - 8, mild inferior hypokinesis, EF 55%. Cors: LM - nl, LAD 0%, 30% at DX1 and 50% after Dx2. LCX - nl, RCA - 70% mid tapering to 99% mid  PCI of RCA done with 2.5x15 balloon ff by 3.0 x 38 Alpine, and prox with 3.5x20 noncompliant with 0% residual.  Echo 1/29/16: Normal left ventricle size, wall thickness and hyperdynamic systolic  function w/ EF 80%. / No regional wall motion abnormalities are seen. No valvular abnormalities  Present. There is trivial tricuspid regurgitation with RVSP 31 mmHg. Trivial mitral regurgitation is present        PLAN:  Repeat lipids, A1C,  and CMP. Encouraged to continue regular swimming for exercise. Since he has a cough on Lisinopril will change to Losartan 25mg daily. Advised to monitor b/p at home to make sure Losartan is the appropriate dose. FU 6 months. Scribe's attestation: This note was scribed in the presence of Dr Danika Guillen MD by Don Knutson RN. The scribe's documentation has been prepared under my direction and personally reviewed by me in its entirety. I confirm that the note above accurately reflects all work, treatment, procedures, and medical decision making performed by me. Thank you for allowing to me to participate in the care of Donnell Kaur. No

## 2023-02-01 ENCOUNTER — OFFICE VISIT (OUTPATIENT)
Dept: CARDIOLOGY CLINIC | Age: 68
End: 2023-02-01
Payer: COMMERCIAL

## 2023-02-01 VITALS
DIASTOLIC BLOOD PRESSURE: 70 MMHG | OXYGEN SATURATION: 98 % | SYSTOLIC BLOOD PRESSURE: 134 MMHG | HEART RATE: 51 BPM | HEIGHT: 66 IN | BODY MASS INDEX: 41.62 KG/M2 | WEIGHT: 259 LBS

## 2023-02-01 DIAGNOSIS — I25.119 CORONARY ARTERY DISEASE INVOLVING NATIVE CORONARY ARTERY OF NATIVE HEART WITH ANGINA PECTORIS (HCC): ICD-10-CM

## 2023-02-01 DIAGNOSIS — I10 ESSENTIAL HYPERTENSION: Primary | ICD-10-CM

## 2023-02-01 DIAGNOSIS — E78.00 PURE HYPERCHOLESTEROLEMIA: ICD-10-CM

## 2023-02-01 DIAGNOSIS — E11.9 TYPE 2 DIABETES MELLITUS WITHOUT COMPLICATION, WITHOUT LONG-TERM CURRENT USE OF INSULIN (HCC): ICD-10-CM

## 2023-02-01 PROCEDURE — 3078F DIAST BP <80 MM HG: CPT | Performed by: INTERNAL MEDICINE

## 2023-02-01 PROCEDURE — 3074F SYST BP LT 130 MM HG: CPT | Performed by: INTERNAL MEDICINE

## 2023-02-01 PROCEDURE — 1123F ACP DISCUSS/DSCN MKR DOCD: CPT | Performed by: INTERNAL MEDICINE

## 2023-02-01 PROCEDURE — 99214 OFFICE O/P EST MOD 30 MIN: CPT | Performed by: INTERNAL MEDICINE

## 2023-02-01 RX ORDER — LOSARTAN POTASSIUM 25 MG/1
25 TABLET ORAL DAILY
Qty: 90 TABLET | Refills: 3 | Status: SHIPPED | OUTPATIENT
Start: 2023-02-01

## 2023-08-14 ENCOUNTER — OFFICE VISIT (OUTPATIENT)
Dept: ORTHOPEDIC SURGERY | Age: 68
End: 2023-08-14
Payer: COMMERCIAL

## 2023-08-14 VITALS — HEIGHT: 66 IN | BODY MASS INDEX: 40.34 KG/M2 | WEIGHT: 251 LBS

## 2023-08-14 DIAGNOSIS — M17.11 PRIMARY OSTEOARTHRITIS OF RIGHT KNEE: Primary | ICD-10-CM

## 2023-08-14 PROCEDURE — 99203 OFFICE O/P NEW LOW 30 MIN: CPT | Performed by: ORTHOPAEDIC SURGERY

## 2023-08-14 PROCEDURE — 1123F ACP DISCUSS/DSCN MKR DOCD: CPT | Performed by: ORTHOPAEDIC SURGERY

## 2023-08-14 PROCEDURE — 20610 DRAIN/INJ JOINT/BURSA W/O US: CPT | Performed by: ORTHOPAEDIC SURGERY

## 2023-08-14 RX ORDER — TRIAMCINOLONE ACETONIDE 40 MG/ML
40 INJECTION, SUSPENSION INTRA-ARTICULAR; INTRAMUSCULAR ONCE
Status: COMPLETED | OUTPATIENT
Start: 2023-08-14 | End: 2023-08-14

## 2023-08-14 RX ORDER — BUPIVACAINE HYDROCHLORIDE 2.5 MG/ML
2 INJECTION, SOLUTION INFILTRATION; PERINEURAL ONCE
Status: COMPLETED | OUTPATIENT
Start: 2023-08-14 | End: 2023-08-14

## 2023-08-14 RX ADMIN — TRIAMCINOLONE ACETONIDE 40 MG: 40 INJECTION, SUSPENSION INTRA-ARTICULAR; INTRAMUSCULAR at 10:21

## 2023-08-14 RX ADMIN — BUPIVACAINE HYDROCHLORIDE 5 MG: 2.5 INJECTION, SOLUTION INFILTRATION; PERINEURAL at 10:21

## 2023-08-14 NOTE — PROGRESS NOTES
[address] [facility]  Office Visit    Chief Complaint: Right knee pain    HPI:  Maulik Marin is a 70 y.o. who is here for initial evaluation of right knee pain. He reports right knee pain for the past 2 months. There is no inciting event. He denies a history of injury or surgery to the right knee. He had no issues with the right knee until 2 months ago. He now reports right knee pain on a daily basis that is present most of the day. The pain is mostly lateral and occasionally medial as well. He occasionally has hip pain. He has a history of left total hip arthroplasty in January 2000. He has been taking Tylenol ibuprofen to help with the pain. He has neuropathy in both feet. He also reports low back pain. He walks without assistive device. He rates pain as up to 9/10. He took oral steroids for a week and this did help with pain at the time. He sees Dr. Zoran Cisneros for his neuropathy. Patient Active Problem List   Diagnosis    Chest discomfort    Myocardial infarction, nontransmural, subendocardial    Pure hypercholesterolemia    Essential hypertension    Coronary artery disease involving native coronary artery of native heart with angina pectoris (HCC)       ROS:  Constitutional: denies fever, chills, weight loss  MSK: denies pain in other joints, muscle aches  Neurological: denies numbness, tingling, weakness    Exam:  Height 5' 6\" (1.676 m), weight 251 lb (113.9 kg). Appearance: sitting in exam room chair, appears to be in no acute distress, awake and alert  Resp: unlabored breathing on room air  Skin: warm, dry and intact with out erythema or significant increased temperature  Neuro: grossly intact both lower extremities. Intact sensation to light touch. Motor exam 4+ to 5/5 in all major motor groups. RLE: Examination reveals that knee range of motion is 0 to 130 degrees.   There is varus deformity, positive crepitus, positive medial joint line tenderness, positive

## 2024-01-15 RX ORDER — LOSARTAN POTASSIUM 25 MG/1
25 TABLET ORAL DAILY
Qty: 90 TABLET | Refills: 3 | Status: SHIPPED | OUTPATIENT
Start: 2024-01-15

## 2024-01-15 NOTE — TELEPHONE ENCOUNTER
Last ov:23 DA  Next ov:  Last EK22  Last labs:  Last filled:   Disp Refills Start End    losartan (COZAAR) 25 MG tablet 90 tablet 3 2023 --    Sig - Route: Take 1 tablet by mouth daily - Oral    Sent to pharmacy as: Losartan Potassium 25 MG Oral Tablet (COZAAR)    Cosign for Ordering: Accepted by Milly Jackman MD on 2023  4:54 PM    E-Prescribing Status: Receipt confirmed by pharmacy (2023  4:28 PM EST)

## 2024-07-01 RX ORDER — METOPROLOL SUCCINATE 25 MG/1
25 TABLET, EXTENDED RELEASE ORAL NIGHTLY
Qty: 30 TABLET | Refills: 0 | Status: SHIPPED | OUTPATIENT
Start: 2024-07-01

## 2024-07-01 NOTE — TELEPHONE ENCOUNTER
[facility]    I spoke to pt and scheduled an apt for July.    30 day supply pending.

## 2024-07-02 RX ORDER — METOPROLOL SUCCINATE 25 MG/1
25 TABLET, EXTENDED RELEASE ORAL NIGHTLY
Qty: 90 TABLET | Refills: 3 | OUTPATIENT
Start: 2024-07-02

## 2024-07-02 RX ORDER — METOPROLOL SUCCINATE 25 MG/1
25 TABLET, EXTENDED RELEASE ORAL NIGHTLY
Qty: 90 TABLET | OUTPATIENT
Start: 2024-07-02

## 2024-07-10 NOTE — PROGRESS NOTES
[facility]   Cardiac Evaluation      Patient: Db Valenzuela  YOB: 1955  Date: 7/12/24       Chief Complaint   Patient presents with    1 Year Follow Up    Hypertension    Coronary Artery Disease    Shortness of Breath    Dizziness          Referring provider: Aleksey Bolaños MD    History of Present Illness:  Mr Valenzuela is seen today in follow up. He was hospitalized January, 2016. His hospital course included a plain stress test, at which time he had very poor exercise tolerance. He was only able to walk on a treadmill for 4 minutes with a hypertensive blood pressure response. He had less than 1 mm up-sloping ST depression; however, had progressive angina on the treadmill. He was admitted to the intensive care and serial cardiac enzymes were performed which continued to increasing from 0.01 to 0.02 to 0.03. He underwent angiogram w/ PCI of RCA done with 3.0 x 38 Alpine.       Today, Mr Valenzuela states he has been feeling well. He denies chest pain, palpitations, dizziness, or edema. He continues to take photos of animals and landscape. He has chronic back and bilateral foot pain. Db mows his lawn and does yard work. He does not do regular exercise. He has shortness of breath with exertion.     Past Medical History:   has a past medical history of CAD (coronary artery disease) and Hyperlipidemia.DM type 2, sleep apnea     Surgical History:   has a past surgical history that includes Total hip arthroplasty (Left); Coronary angioplasty with stent (2016); and joint replacement (2000).     Current Outpatient Medications   Medication Sig Dispense Refill    metoprolol succinate (TOPROL XL) 25 MG extended release tablet TAKE 1 TABLET BY MOUTH AT BEDTIME 30 tablet 0    losartan (COZAAR) 25 MG tablet TAKE 1 TABLET BY MOUTH DAILY 90 tablet 3    rOPINIRole (REQUIP) 0.25 MG tablet TAKE 1 TABLET BY MOUTH EVERY NIGHT AT BEDTIME AS NEEDED      prasugrel (EFFIENT) 10 MG TABS Take 1 tablet by mouth

## 2024-07-12 ENCOUNTER — OFFICE VISIT (OUTPATIENT)
Dept: CARDIOLOGY CLINIC | Age: 69
End: 2024-07-12
Payer: COMMERCIAL

## 2024-07-12 VITALS
SYSTOLIC BLOOD PRESSURE: 110 MMHG | HEIGHT: 66 IN | BODY MASS INDEX: 42.75 KG/M2 | OXYGEN SATURATION: 98 % | HEART RATE: 62 BPM | DIASTOLIC BLOOD PRESSURE: 64 MMHG | WEIGHT: 266 LBS

## 2024-07-12 DIAGNOSIS — I25.119 CORONARY ARTERY DISEASE INVOLVING NATIVE CORONARY ARTERY OF NATIVE HEART WITH ANGINA PECTORIS (HCC): Primary | ICD-10-CM

## 2024-07-12 DIAGNOSIS — E78.00 PURE HYPERCHOLESTEROLEMIA: ICD-10-CM

## 2024-07-12 DIAGNOSIS — R06.02 SHORTNESS OF BREATH: ICD-10-CM

## 2024-07-12 DIAGNOSIS — I10 ESSENTIAL HYPERTENSION: ICD-10-CM

## 2024-07-12 DIAGNOSIS — I25.10 CORONARY ARTERY DISEASE INVOLVING NATIVE CORONARY ARTERY OF NATIVE HEART WITHOUT ANGINA PECTORIS: ICD-10-CM

## 2024-07-12 PROCEDURE — 1123F ACP DISCUSS/DSCN MKR DOCD: CPT | Performed by: INTERNAL MEDICINE

## 2024-07-12 PROCEDURE — 3074F SYST BP LT 130 MM HG: CPT | Performed by: INTERNAL MEDICINE

## 2024-07-12 PROCEDURE — 99214 OFFICE O/P EST MOD 30 MIN: CPT | Performed by: INTERNAL MEDICINE

## 2024-07-12 PROCEDURE — 3078F DIAST BP <80 MM HG: CPT | Performed by: INTERNAL MEDICINE

## 2024-07-12 RX ORDER — METOPROLOL SUCCINATE 25 MG/1
25 TABLET, EXTENDED RELEASE ORAL NIGHTLY
Qty: 90 TABLET | Refills: 3 | Status: SHIPPED | OUTPATIENT
Start: 2024-07-12

## 2024-11-01 ENCOUNTER — HOSPITAL ENCOUNTER (EMERGENCY)
Age: 69
Discharge: HOME | End: 2024-11-01
Attending: EMERGENCY MEDICINE
Payer: COMMERCIAL

## 2024-11-01 ENCOUNTER — APPOINTMENT (OUTPATIENT)
Dept: GENERAL RADIOLOGY | Age: 69
End: 2024-11-01
Payer: COMMERCIAL

## 2024-11-01 VITALS
WEIGHT: 260 LBS | SYSTOLIC BLOOD PRESSURE: 148 MMHG | TEMPERATURE: 98 F | DIASTOLIC BLOOD PRESSURE: 75 MMHG | HEART RATE: 76 BPM | RESPIRATION RATE: 16 BRPM | OXYGEN SATURATION: 99 % | HEIGHT: 67 IN | BODY MASS INDEX: 40.81 KG/M2

## 2024-11-01 DIAGNOSIS — S46.911A STRAIN OF RIGHT SHOULDER, INITIAL ENCOUNTER: Primary | ICD-10-CM

## 2024-11-01 PROCEDURE — 99283 EMERGENCY DEPT VISIT LOW MDM: CPT

## 2024-11-01 PROCEDURE — 73030 X-RAY EXAM OF SHOULDER: CPT

## 2024-11-01 PROCEDURE — 6370000000 HC RX 637 (ALT 250 FOR IP): Performed by: EMERGENCY MEDICINE

## 2024-11-01 RX ORDER — CYCLOBENZAPRINE HCL 10 MG
10 TABLET ORAL ONCE
Status: COMPLETED | OUTPATIENT
Start: 2024-11-01 | End: 2024-11-01

## 2024-11-01 RX ORDER — CYCLOBENZAPRINE HCL 10 MG
10 TABLET ORAL 3 TIMES DAILY PRN
Qty: 20 TABLET | Refills: 0 | Status: SHIPPED | OUTPATIENT
Start: 2024-11-01 | End: 2024-11-11

## 2024-11-01 RX ORDER — LIDOCAINE 50 MG/G
1 PATCH TOPICAL DAILY
Qty: 30 PATCH | Refills: 0 | Status: SHIPPED | OUTPATIENT
Start: 2024-11-01

## 2024-11-01 RX ADMIN — CYCLOBENZAPRINE HYDROCHLORIDE 10 MG: 10 TABLET, FILM COATED ORAL at 12:30

## 2024-11-01 ASSESSMENT — PAIN - FUNCTIONAL ASSESSMENT
PAIN_FUNCTIONAL_ASSESSMENT: 0-10
PAIN_FUNCTIONAL_ASSESSMENT: 0-10

## 2024-11-01 ASSESSMENT — PAIN DESCRIPTION - ORIENTATION: ORIENTATION: RIGHT

## 2024-11-01 ASSESSMENT — PAIN SCALES - GENERAL
PAINLEVEL_OUTOF10: 10
PAINLEVEL_OUTOF10: 8

## 2024-11-01 ASSESSMENT — PAIN DESCRIPTION - DESCRIPTORS: DESCRIPTORS: DISCOMFORT

## 2024-11-01 ASSESSMENT — PAIN DESCRIPTION - LOCATION: LOCATION: SHOULDER

## 2024-11-04 ENCOUNTER — TELEPHONE (OUTPATIENT)
Dept: ORTHOPEDIC SURGERY | Age: 69
End: 2024-11-04

## 2024-11-05 ENCOUNTER — OFFICE VISIT (OUTPATIENT)
Dept: ORTHOPEDIC SURGERY | Age: 69
End: 2024-11-05
Payer: COMMERCIAL

## 2024-11-05 VITALS — BODY MASS INDEX: 40.81 KG/M2 | WEIGHT: 260 LBS | HEIGHT: 67 IN

## 2024-11-05 DIAGNOSIS — M25.511 RIGHT SHOULDER PAIN, UNSPECIFIED CHRONICITY: Primary | ICD-10-CM

## 2024-11-05 PROCEDURE — 99204 OFFICE O/P NEW MOD 45 MIN: CPT | Performed by: ORTHOPAEDIC SURGERY

## 2024-11-05 PROCEDURE — 1123F ACP DISCUSS/DSCN MKR DOCD: CPT | Performed by: ORTHOPAEDIC SURGERY

## 2024-11-05 RX ORDER — METHYLPREDNISOLONE 4 MG/1
TABLET ORAL
Qty: 1 KIT | Refills: 0 | Status: SHIPPED | OUTPATIENT
Start: 2024-11-05

## 2024-11-19 NOTE — PROGRESS NOTES
11/5/2024     Reason for visit:  Right shoulder pain following injury    History of Present Illness:  The patient is a 69-year-old male who presents for evaluation.  He reports injured himself on 10/31/2024.  He was lifting a heavy object when he felt a popping sensation.  Following that he had diffuse shoulder pain.  Ever since then he has had difficulty with overhead activities, reaching, and lifting.  He has been treated with activity modification, anti-inflammatory medications, and home physical therapy but is remains symptomatic.    Medical History:  Past Medical History:   Diagnosis Date    CAD (coronary artery disease)     Hyperlipidemia       Past Surgical History:   Procedure Laterality Date    CORONARY ANGIOPLASTY WITH STENT PLACEMENT  2016    JOINT REPLACEMENT  2000    hip    TOTAL HIP ARTHROPLASTY Left       Family History   Problem Relation Age of Onset    Heart Disease Mother     Heart Disease Father     Other Sister     Heart Disease Brother       Social History     Socioeconomic History    Marital status:      Spouse name: Not on file    Number of children: Not on file    Years of education: Not on file    Highest education level: Not on file   Occupational History    Not on file   Tobacco Use    Smoking status: Never    Smokeless tobacco: Never   Vaping Use    Vaping status: Unknown   Substance and Sexual Activity    Alcohol use: Yes     Comment: occoc    Drug use: No    Sexual activity: Yes     Partners: Female   Other Topics Concern    Not on file   Social History Narrative    Not on file     Social Determinants of Health     Financial Resource Strain: Not on file   Food Insecurity: Not on file   Transportation Needs: Not on file   Physical Activity: Not on file   Stress: Not on file   Social Connections: Not on file   Intimate Partner Violence: Not on file   Housing Stability: Not on file      Current Outpatient Medications on File Prior to Visit   Medication Sig Dispense Refill

## 2025-01-07 RX ORDER — LOSARTAN POTASSIUM 25 MG/1
25 TABLET ORAL DAILY
Qty: 90 TABLET | Refills: 3 | OUTPATIENT
Start: 2025-01-07

## 2025-01-07 RX ORDER — LOSARTAN POTASSIUM 25 MG/1
25 TABLET ORAL DAILY
Qty: 90 TABLET | Refills: 3 | Status: SHIPPED | OUTPATIENT
Start: 2025-01-07

## 2025-01-17 RX ORDER — LOSARTAN POTASSIUM 25 MG/1
25 TABLET ORAL DAILY
Qty: 90 TABLET | Refills: 3 | Status: SHIPPED | OUTPATIENT
Start: 2025-01-17

## 2025-01-17 NOTE — TELEPHONE ENCOUNTER
Patient called for refill on Losartan 25mg daily. Refill denied on 1/6/25 because it was a duplicate request. I relayed to Mr Valenzuela I will resend the rx to [facility]. Rx sent

## 2025-04-25 RX ORDER — METOPROLOL SUCCINATE 25 MG/1
25 TABLET, EXTENDED RELEASE ORAL NIGHTLY
Qty: 90 TABLET | Refills: 3 | Status: SHIPPED | OUTPATIENT
Start: 2025-04-25

## 2025-04-25 NOTE — TELEPHONE ENCOUNTER
Last ov:24 DAH  Next ov:25 DAH  Last EK22  Last labs: CareEverywhere 24  Last filled:   Disp Refills Start End    metoprolol succinate (TOPROL XL) 25 MG extended release tablet 90 tablet 3 2024 --    Sig - Route: Take 1 tablet by mouth at bedtime - Oral    Sent to pharmacy as: Metoprolol Succinate ER 25 MG Oral Tablet Extended Release 24 Hour (TOPROL XL)    Cosign for Ordering: Accepted by Milly Jackman MD on 2024  1:27 PM    E-Prescribing Status: Receipt confirmed by pharmacy (2024  4:17 PM EDT)

## 2025-07-15 NOTE — PROGRESS NOTES
[facility]   Cardiac Evaluation      Patient: Db Valenzuela  YOB: 1955  Date: 7/15/25     No chief complaint on file.    Referring provider: Aleksey Bolaños MD    History of Present Illness:  Mr Valenzuela is seen today in annual follow up. He was hospitalized January, 2016. His hospital course included a plain stress test, at which time he had very poor exercise tolerance. He was only able to walk on a treadmill for 4 minutes with a hypertensive blood pressure response. He had less than 1 mm up-sloping ST depression; however, had progressive angina on the treadmill. He was admitted to the intensive care and serial cardiac enzymes were performed which continued to increasing from 0.01 to 0.02 to 0.03. He underwent angiogram w/ PCI of RCA done with 3.0 x 38 Alpine.      Today, Mr Valenzuela     He has chronic back and bilateral foot pain. He mows his lawn and does yard work.  He continues to take photos of animals and landscape.     Past Medical History:   has a past medical history of CAD (coronary artery disease) and Hyperlipidemia.DM type 2, sleep apnea     Surgical History:   has a past surgical history that includes Total hip arthroplasty (Left); Coronary angioplasty with stent (2016); and joint replacement (2000).     Current Outpatient Medications   Medication Sig Dispense Refill    metoprolol succinate (TOPROL XL) 25 MG extended release tablet TAKE 1 TABLET BY MOUTH AT BEDTIME 90 tablet 3    losartan (COZAAR) 25 MG tablet Take 1 tablet by mouth daily 90 tablet 3    methylPREDNISolone (MEDROL, SONAM,) 4 MG tablet Take by mouth. 1 kit 0    lidocaine (LIDODERM) 5 % Place 1 patch onto the skin daily 12 hours on, 12 hours off. 30 patch 0    rOPINIRole (REQUIP) 0.25 MG tablet TAKE 1 TABLET BY MOUTH EVERY NIGHT AT BEDTIME AS NEEDED      prasugrel (EFFIENT) 10 MG TABS Take 1 tablet by mouth daily      fluticasone (FLONASE) 50 MCG/ACT nasal spray as needed      metFORMIN (GLUCOPHAGE) 500 MG tablet 
affect, memory, mentation, behavior.  Psychiatric: No anxiety or depression  Endocrine: No malaise or fever  Hematologic/Lymphatic: No abnormal bruising or bleeding, blood clots or swollen lymph nodes.  Allergic/Immunologic: No nasal congestion or hives.    Physical Examination:    Vitals:    07/23/25 1555   BP: 136/62   BP Site: Left Upper Arm   Patient Position: Sitting   BP Cuff Size: Large Adult   Pulse: 60   SpO2: 96%   Weight: 118.8 kg (262 lb)   Height: 1.702 m (5' 7\")       Body mass index is 41.04 kg/m².     Wt Readings from Last 3 Encounters:   07/23/25 118.8 kg (262 lb)   11/05/24 117.9 kg (260 lb)   11/01/24 117.9 kg (260 lb)      BP Readings from Last 3 Encounters:   07/23/25 136/62   11/01/24 (!) 148/75   07/12/24 110/64      Constitutional and General Appearance:  appears stated age, obese, big white beard  Respiratory:  Normal excursion and expansion without use of accessory muscles  Resp Auscultation: Normal breath sounds without dullness  Cardiovascular:  The apical impulses not displaced  Heart is regular rate and rhythm with normal S1, S2  The carotid upstroke is normal, no bruit noted   JVP is not elevated  Peripheral pulses are symmetrical  There is no clubbing, cyanosis of the extremities, fungal nails of the feet  Trace BLE edema   Femoral Arteries: 2+ and equal  Pedal Pulses: 2+ and equal   Abdomen:  No masses or tenderness  Normal bowel sounds  Neurological/Psychiatric:  Alert and oriented x3  Moves all extremities well  Exhibits normal gait balance and coordination    Assessment:  1. Chest discomfort, H/O--no complaints at this time   2. Pure hypercholesterolemia - stable on labs 6/28/24: ; TRIG 102; HDL 39; LDL 70, lipitor 80mg   3. Essential hypertension -  Lisinopril changed to Losartan 25mg daily due to cough   4. Coronary artery disease involving native coronary artery of native heart with angina pectoris -stable  -Plain stress 7/24/24: [facility] GXT done without complications.   Pt

## 2025-07-23 ENCOUNTER — OFFICE VISIT (OUTPATIENT)
Dept: CARDIOLOGY CLINIC | Age: 70
End: 2025-07-23
Payer: COMMERCIAL

## 2025-07-23 VITALS
HEIGHT: 67 IN | HEART RATE: 60 BPM | DIASTOLIC BLOOD PRESSURE: 62 MMHG | BODY MASS INDEX: 41.12 KG/M2 | OXYGEN SATURATION: 96 % | WEIGHT: 262 LBS | SYSTOLIC BLOOD PRESSURE: 136 MMHG

## 2025-07-23 DIAGNOSIS — I25.10 CORONARY ARTERY DISEASE INVOLVING NATIVE CORONARY ARTERY OF NATIVE HEART WITHOUT ANGINA PECTORIS: ICD-10-CM

## 2025-07-23 DIAGNOSIS — I10 ESSENTIAL HYPERTENSION: Primary | ICD-10-CM

## 2025-07-23 DIAGNOSIS — E11.9 TYPE 2 DIABETES MELLITUS WITHOUT COMPLICATION, WITHOUT LONG-TERM CURRENT USE OF INSULIN (HCC): ICD-10-CM

## 2025-07-23 DIAGNOSIS — E78.00 PURE HYPERCHOLESTEROLEMIA: ICD-10-CM

## 2025-07-23 PROCEDURE — 1123F ACP DISCUSS/DSCN MKR DOCD: CPT | Performed by: INTERNAL MEDICINE

## 2025-07-23 PROCEDURE — 3075F SYST BP GE 130 - 139MM HG: CPT | Performed by: INTERNAL MEDICINE

## 2025-07-23 PROCEDURE — 99214 OFFICE O/P EST MOD 30 MIN: CPT | Performed by: INTERNAL MEDICINE

## 2025-07-23 PROCEDURE — 3078F DIAST BP <80 MM HG: CPT | Performed by: INTERNAL MEDICINE

## 2025-07-23 RX ORDER — TAMSULOSIN HYDROCHLORIDE 0.4 MG/1
0.4 CAPSULE ORAL NIGHTLY
COMMUNITY
Start: 2025-07-22